# Patient Record
Sex: FEMALE | Race: WHITE | NOT HISPANIC OR LATINO | Employment: FULL TIME | URBAN - METROPOLITAN AREA
[De-identification: names, ages, dates, MRNs, and addresses within clinical notes are randomized per-mention and may not be internally consistent; named-entity substitution may affect disease eponyms.]

---

## 2017-01-09 ENCOUNTER — OFFICE VISIT - HEALTHEAST (OUTPATIENT)
Dept: RHEUMATOLOGY | Facility: CLINIC | Age: 47
End: 2017-01-09

## 2017-01-09 DIAGNOSIS — L98.491 ISCHEMIC ULCER OF FINGER, LIMITED TO BREAKDOWN OF SKIN (H): ICD-10-CM

## 2017-01-09 DIAGNOSIS — I73.00 RAYNAUD'S DISEASE WITHOUT GANGRENE: ICD-10-CM

## 2017-01-09 DIAGNOSIS — R60.0 BILATERAL LOWER EXTREMITY EDEMA: ICD-10-CM

## 2017-02-02 ENCOUNTER — COMMUNICATION - HEALTHEAST (OUTPATIENT)
Dept: RHEUMATOLOGY | Facility: CLINIC | Age: 47
End: 2017-02-02

## 2017-02-02 DIAGNOSIS — I73.00 RAYNAUD'S DISEASE WITHOUT GANGRENE: ICD-10-CM

## 2017-02-02 DIAGNOSIS — L98.491 ISCHEMIC ULCER OF FINGER, LIMITED TO BREAKDOWN OF SKIN (H): ICD-10-CM

## 2017-05-03 ENCOUNTER — COMMUNICATION - HEALTHEAST (OUTPATIENT)
Dept: FAMILY MEDICINE | Facility: CLINIC | Age: 47
End: 2017-05-03

## 2017-05-03 DIAGNOSIS — I73.00 RAYNAUD'S DISEASE WITHOUT GANGRENE: ICD-10-CM

## 2017-05-09 ENCOUNTER — OFFICE VISIT - HEALTHEAST (OUTPATIENT)
Dept: RHEUMATOLOGY | Facility: CLINIC | Age: 47
End: 2017-05-09

## 2017-05-09 DIAGNOSIS — I73.00 RAYNAUD'S DISEASE WITHOUT GANGRENE: ICD-10-CM

## 2017-05-09 DIAGNOSIS — R12 HEARTBURN: ICD-10-CM

## 2017-05-09 DIAGNOSIS — L98.491 ISCHEMIC ULCER OF FINGER, LIMITED TO BREAKDOWN OF SKIN (H): ICD-10-CM

## 2017-05-09 LAB
ALT SERPL W P-5'-P-CCNC: 16 U/L (ref 0–45)
CREAT SERPL-MCNC: 0.76 MG/DL (ref 0.6–1.1)
GFR SERPL CREATININE-BSD FRML MDRD: >60 ML/MIN/1.73M2

## 2017-05-09 ASSESSMENT — MIFFLIN-ST. JEOR: SCORE: 1384.27

## 2017-05-10 LAB — CENTROMERE AB, IGG, S: <0.2 U

## 2017-09-25 ENCOUNTER — COMMUNICATION - HEALTHEAST (OUTPATIENT)
Dept: ADMINISTRATIVE | Facility: CLINIC | Age: 47
End: 2017-09-25

## 2017-09-25 DIAGNOSIS — I73.00 RAYNAUD'S DISEASE WITHOUT GANGRENE: ICD-10-CM

## 2017-09-25 DIAGNOSIS — L98.491 ISCHEMIC ULCER OF FINGER, LIMITED TO BREAKDOWN OF SKIN (H): ICD-10-CM

## 2017-11-11 ENCOUNTER — COMMUNICATION - HEALTHEAST (OUTPATIENT)
Dept: RHEUMATOLOGY | Facility: CLINIC | Age: 47
End: 2017-11-11

## 2017-11-11 DIAGNOSIS — I73.00 RAYNAUD'S DISEASE WITHOUT GANGRENE: ICD-10-CM

## 2017-11-14 ENCOUNTER — COMMUNICATION - HEALTHEAST (OUTPATIENT)
Dept: RHEUMATOLOGY | Facility: CLINIC | Age: 47
End: 2017-11-14

## 2017-11-14 DIAGNOSIS — I73.00 RAYNAUD'S DISEASE WITHOUT GANGRENE: ICD-10-CM

## 2017-11-22 ENCOUNTER — OFFICE VISIT - HEALTHEAST (OUTPATIENT)
Dept: RHEUMATOLOGY | Facility: CLINIC | Age: 47
End: 2017-11-22

## 2017-11-22 DIAGNOSIS — R12 HEARTBURN: ICD-10-CM

## 2017-11-22 DIAGNOSIS — L98.491 ISCHEMIC ULCER OF FINGER, LIMITED TO BREAKDOWN OF SKIN (H): ICD-10-CM

## 2017-11-22 DIAGNOSIS — I73.00 RAYNAUD'S DISEASE WITHOUT GANGRENE: ICD-10-CM

## 2017-11-22 ASSESSMENT — MIFFLIN-ST. JEOR: SCORE: 1383.82

## 2018-01-10 ENCOUNTER — COMMUNICATION - HEALTHEAST (OUTPATIENT)
Dept: RHEUMATOLOGY | Facility: CLINIC | Age: 48
End: 2018-01-10

## 2018-01-10 DIAGNOSIS — I73.00 RAYNAUD'S DISEASE WITHOUT GANGRENE: ICD-10-CM

## 2018-01-10 DIAGNOSIS — L98.491 ISCHEMIC ULCER OF FINGER, LIMITED TO BREAKDOWN OF SKIN (H): ICD-10-CM

## 2018-01-12 ENCOUNTER — COMMUNICATION - HEALTHEAST (OUTPATIENT)
Dept: ADMINISTRATIVE | Facility: CLINIC | Age: 48
End: 2018-01-12

## 2018-01-24 ENCOUNTER — COMMUNICATION - HEALTHEAST (OUTPATIENT)
Dept: RHEUMATOLOGY | Facility: CLINIC | Age: 48
End: 2018-01-24

## 2018-02-14 ENCOUNTER — COMMUNICATION - HEALTHEAST (OUTPATIENT)
Dept: RHEUMATOLOGY | Facility: CLINIC | Age: 48
End: 2018-02-14

## 2018-04-16 ENCOUNTER — COMMUNICATION - HEALTHEAST (OUTPATIENT)
Dept: RHEUMATOLOGY | Facility: CLINIC | Age: 48
End: 2018-04-16

## 2018-04-16 DIAGNOSIS — I73.00 RAYNAUD'S DISEASE WITHOUT GANGRENE: ICD-10-CM

## 2018-04-16 DIAGNOSIS — L98.491 ISCHEMIC ULCER OF FINGER, LIMITED TO BREAKDOWN OF SKIN (H): ICD-10-CM

## 2018-07-16 ENCOUNTER — COMMUNICATION - HEALTHEAST (OUTPATIENT)
Dept: RHEUMATOLOGY | Facility: CLINIC | Age: 48
End: 2018-07-16

## 2018-07-16 DIAGNOSIS — I73.00 RAYNAUD'S DISEASE WITHOUT GANGRENE: ICD-10-CM

## 2018-07-25 ENCOUNTER — OFFICE VISIT - HEALTHEAST (OUTPATIENT)
Dept: FAMILY MEDICINE | Facility: CLINIC | Age: 48
End: 2018-07-25

## 2018-07-25 DIAGNOSIS — B35.3 ATHLETE'S FOOT: ICD-10-CM

## 2018-07-25 DIAGNOSIS — Z00.00 HEALTHCARE MAINTENANCE: ICD-10-CM

## 2018-07-25 DIAGNOSIS — M79.89 HAND SWELLING: ICD-10-CM

## 2018-07-25 DIAGNOSIS — Z12.31 VISIT FOR SCREENING MAMMOGRAM: ICD-10-CM

## 2018-07-25 DIAGNOSIS — N92.0 MENORRHAGIA: ICD-10-CM

## 2018-07-25 LAB
ANION GAP SERPL CALCULATED.3IONS-SCNC: 8 MMOL/L (ref 5–18)
BUN SERPL-MCNC: 16 MG/DL (ref 8–22)
CALCIUM SERPL-MCNC: 9.5 MG/DL (ref 8.5–10.5)
CHLORIDE BLD-SCNC: 104 MMOL/L (ref 98–107)
CHOLEST SERPL-MCNC: 204 MG/DL
CO2 SERPL-SCNC: 25 MMOL/L (ref 22–31)
CREAT SERPL-MCNC: 0.78 MG/DL (ref 0.6–1.1)
FASTING STATUS PATIENT QL REPORTED: NO
GFR SERPL CREATININE-BSD FRML MDRD: >60 ML/MIN/1.73M2
GLUCOSE BLD-MCNC: 76 MG/DL (ref 70–125)
HBA1C MFR BLD: 4.7 % (ref 3.5–6)
HDLC SERPL-MCNC: 75 MG/DL
LDLC SERPL CALC-MCNC: 115 MG/DL
POTASSIUM BLD-SCNC: 4 MMOL/L (ref 3.5–5)
SODIUM SERPL-SCNC: 137 MMOL/L (ref 136–145)
TRIGL SERPL-MCNC: 70 MG/DL
TSH SERPL DL<=0.005 MIU/L-ACNC: 0.86 UIU/ML (ref 0.3–5)

## 2018-07-25 ASSESSMENT — MIFFLIN-ST. JEOR: SCORE: 1356.38

## 2018-07-26 LAB
HPV SOURCE: NORMAL
HUMAN PAPILLOMA VIRUS 16 DNA: NEGATIVE
HUMAN PAPILLOMA VIRUS 18 DNA: NEGATIVE
HUMAN PAPILLOMA VIRUS FINAL DIAGNOSIS: NORMAL
HUMAN PAPILLOMA VIRUS OTHER HR: NEGATIVE
SPECIMEN DESCRIPTION: NORMAL

## 2018-07-30 ENCOUNTER — COMMUNICATION - HEALTHEAST (OUTPATIENT)
Dept: RHEUMATOLOGY | Facility: CLINIC | Age: 48
End: 2018-07-30

## 2018-07-30 DIAGNOSIS — I73.00 RAYNAUD'S DISEASE WITHOUT GANGRENE: ICD-10-CM

## 2018-07-30 DIAGNOSIS — L98.491 ISCHEMIC ULCER OF FINGER, LIMITED TO BREAKDOWN OF SKIN (H): ICD-10-CM

## 2018-08-02 LAB
BKR LAB AP ABNORMAL BLEEDING: NO
BKR LAB AP BIRTH CONTROL/HORMONES: NORMAL
BKR LAB AP CERVICAL APPEARANCE: NORMAL
BKR LAB AP GYN ADEQUACY: NORMAL
BKR LAB AP GYN INTERPRETATION: NORMAL
BKR LAB AP HPV REFLEX: NORMAL
BKR LAB AP LMP: NORMAL
BKR LAB AP PATIENT STATUS: NORMAL
BKR LAB AP PREVIOUS ABNORMAL: NORMAL
BKR LAB AP PREVIOUS NORMAL: 2014
HIGH RISK?: NO
PATH REPORT.COMMENTS IMP SPEC: NORMAL
RESULT FLAG (HE HISTORICAL CONVERSION): NORMAL

## 2018-08-06 ENCOUNTER — HOSPITAL ENCOUNTER (OUTPATIENT)
Dept: MAMMOGRAPHY | Facility: CLINIC | Age: 48
Discharge: HOME | End: 2018-08-06
Attending: FAMILY MEDICINE

## 2018-08-06 DIAGNOSIS — Z12.31 VISIT FOR SCREENING MAMMOGRAM: ICD-10-CM

## 2018-08-27 ENCOUNTER — OFFICE VISIT - HEALTHEAST (OUTPATIENT)
Dept: RHEUMATOLOGY | Facility: CLINIC | Age: 48
End: 2018-08-27

## 2018-08-27 DIAGNOSIS — L98.491 ISCHEMIC ULCER OF FINGER, LIMITED TO BREAKDOWN OF SKIN (H): ICD-10-CM

## 2018-08-27 DIAGNOSIS — I73.00 RAYNAUD'S DISEASE WITHOUT GANGRENE: ICD-10-CM

## 2018-08-27 DIAGNOSIS — R12 HEARTBURN: ICD-10-CM

## 2018-08-27 LAB
BASOPHILS # BLD AUTO: 0 THOU/UL (ref 0–0.2)
BASOPHILS NFR BLD AUTO: 0 % (ref 0–2)
EOSINOPHIL # BLD AUTO: 0.2 THOU/UL (ref 0–0.4)
EOSINOPHIL NFR BLD AUTO: 3 % (ref 0–6)
ERYTHROCYTE [DISTWIDTH] IN BLOOD BY AUTOMATED COUNT: 11.1 % (ref 11–14.5)
HCT VFR BLD AUTO: 38.9 % (ref 35–47)
HGB BLD-MCNC: 13.5 G/DL (ref 12–16)
LYMPHOCYTES # BLD AUTO: 1.2 THOU/UL (ref 0.8–4.4)
LYMPHOCYTES NFR BLD AUTO: 20 % (ref 20–40)
MCH RBC QN AUTO: 33.3 PG (ref 27–34)
MCHC RBC AUTO-ENTMCNC: 34.8 G/DL (ref 32–36)
MCV RBC AUTO: 96 FL (ref 80–100)
MONOCYTES # BLD AUTO: 0.5 THOU/UL (ref 0–0.9)
MONOCYTES NFR BLD AUTO: 8 % (ref 2–10)
NEUTROPHILS # BLD AUTO: 4.2 THOU/UL (ref 2–7.7)
NEUTROPHILS NFR BLD AUTO: 69 % (ref 50–70)
PLATELET # BLD AUTO: 245 THOU/UL (ref 140–440)
PMV BLD AUTO: 7.7 FL (ref 7–10)
RBC # BLD AUTO: 4.07 MILL/UL (ref 3.8–5.4)
WBC: 6.1 THOU/UL (ref 4–11)

## 2018-09-13 ENCOUNTER — COMMUNICATION - HEALTHEAST (OUTPATIENT)
Dept: RHEUMATOLOGY | Facility: CLINIC | Age: 48
End: 2018-09-13

## 2018-09-13 DIAGNOSIS — I73.00 RAYNAUD'S DISEASE WITHOUT GANGRENE: ICD-10-CM

## 2018-09-14 ENCOUNTER — COMMUNICATION - HEALTHEAST (OUTPATIENT)
Dept: RHEUMATOLOGY | Facility: CLINIC | Age: 48
End: 2018-09-14

## 2018-09-14 DIAGNOSIS — I73.00 RAYNAUD'S DISEASE WITHOUT GANGRENE: ICD-10-CM

## 2018-09-18 ENCOUNTER — COMMUNICATION - HEALTHEAST (OUTPATIENT)
Dept: RHEUMATOLOGY | Facility: CLINIC | Age: 48
End: 2018-09-18

## 2018-09-18 DIAGNOSIS — I73.00 RAYNAUD'S DISEASE WITHOUT GANGRENE: ICD-10-CM

## 2018-11-21 ENCOUNTER — COMMUNICATION - HEALTHEAST (OUTPATIENT)
Dept: RHEUMATOLOGY | Facility: CLINIC | Age: 48
End: 2018-11-21

## 2018-11-21 DIAGNOSIS — I73.00 RAYNAUD'S DISEASE WITHOUT GANGRENE: ICD-10-CM

## 2018-12-20 ENCOUNTER — COMMUNICATION - HEALTHEAST (OUTPATIENT)
Dept: RHEUMATOLOGY | Facility: CLINIC | Age: 48
End: 2018-12-20

## 2018-12-20 DIAGNOSIS — I73.00 RAYNAUD'S DISEASE WITHOUT GANGRENE: ICD-10-CM

## 2018-12-21 ENCOUNTER — COMMUNICATION - HEALTHEAST (OUTPATIENT)
Dept: FAMILY MEDICINE | Facility: CLINIC | Age: 48
End: 2018-12-21

## 2019-01-11 ENCOUNTER — COMMUNICATION - HEALTHEAST (OUTPATIENT)
Dept: FAMILY MEDICINE | Facility: CLINIC | Age: 49
End: 2019-01-11

## 2019-01-11 DIAGNOSIS — M79.89 HAND SWELLING: ICD-10-CM

## 2019-01-17 ENCOUNTER — COMMUNICATION - HEALTHEAST (OUTPATIENT)
Dept: RHEUMATOLOGY | Facility: CLINIC | Age: 49
End: 2019-01-17

## 2019-01-17 DIAGNOSIS — L98.491 ISCHEMIC ULCER OF FINGER, LIMITED TO BREAKDOWN OF SKIN (H): ICD-10-CM

## 2019-01-17 DIAGNOSIS — I73.00 RAYNAUD'S DISEASE WITHOUT GANGRENE: ICD-10-CM

## 2019-03-06 ENCOUNTER — OFFICE VISIT - HEALTHEAST (OUTPATIENT)
Dept: FAMILY MEDICINE | Facility: CLINIC | Age: 49
End: 2019-03-06

## 2019-03-06 DIAGNOSIS — Z23 IMMUNIZATION DUE: ICD-10-CM

## 2019-03-06 DIAGNOSIS — L98.491 ISCHEMIC ULCER OF FINGER, LIMITED TO BREAKDOWN OF SKIN (H): ICD-10-CM

## 2019-03-06 DIAGNOSIS — R22.9 SKIN NODULE: ICD-10-CM

## 2019-03-16 ENCOUNTER — COMMUNICATION - HEALTHEAST (OUTPATIENT)
Dept: RHEUMATOLOGY | Facility: CLINIC | Age: 49
End: 2019-03-16

## 2019-03-16 DIAGNOSIS — I73.00 RAYNAUD'S DISEASE WITHOUT GANGRENE: ICD-10-CM

## 2019-04-03 ENCOUNTER — OFFICE VISIT - HEALTHEAST (OUTPATIENT)
Dept: RHEUMATOLOGY | Facility: CLINIC | Age: 49
End: 2019-04-03

## 2019-04-03 DIAGNOSIS — L98.491 ISCHEMIC ULCER OF FINGER, LIMITED TO BREAKDOWN OF SKIN (H): ICD-10-CM

## 2019-04-03 DIAGNOSIS — I73.00 RAYNAUD'S DISEASE WITHOUT GANGRENE: ICD-10-CM

## 2019-04-03 LAB
ALBUMIN SERPL-MCNC: 4.3 G/DL (ref 3.5–5)
ALT SERPL W P-5'-P-CCNC: 34 U/L (ref 0–45)
CREAT SERPL-MCNC: 0.8 MG/DL (ref 0.6–1.1)
ERYTHROCYTE [DISTWIDTH] IN BLOOD BY AUTOMATED COUNT: 11.3 % (ref 11–14.5)
GFR SERPL CREATININE-BSD FRML MDRD: >60 ML/MIN/1.73M2
HCT VFR BLD AUTO: 39.6 % (ref 35–47)
HGB BLD-MCNC: 13.3 G/DL (ref 12–16)
MCH RBC QN AUTO: 32.5 PG (ref 27–34)
MCHC RBC AUTO-ENTMCNC: 33.5 G/DL (ref 32–36)
MCV RBC AUTO: 97 FL (ref 80–100)
PLATELET # BLD AUTO: 268 THOU/UL (ref 140–440)
PMV BLD AUTO: 7.9 FL (ref 7–10)
RBC # BLD AUTO: 4.09 MILL/UL (ref 3.8–5.4)
WBC: 7 THOU/UL (ref 4–11)

## 2019-04-08 ENCOUNTER — COMMUNICATION - HEALTHEAST (OUTPATIENT)
Dept: FAMILY MEDICINE | Facility: CLINIC | Age: 49
End: 2019-04-08

## 2019-04-08 DIAGNOSIS — M79.89 HAND SWELLING: ICD-10-CM

## 2019-04-15 ENCOUNTER — COMMUNICATION - HEALTHEAST (OUTPATIENT)
Dept: RHEUMATOLOGY | Facility: CLINIC | Age: 49
End: 2019-04-15

## 2019-04-23 ENCOUNTER — COMMUNICATION - HEALTHEAST (OUTPATIENT)
Dept: RHEUMATOLOGY | Facility: CLINIC | Age: 49
End: 2019-04-23

## 2019-06-09 ENCOUNTER — COMMUNICATION - HEALTHEAST (OUTPATIENT)
Dept: RHEUMATOLOGY | Facility: CLINIC | Age: 49
End: 2019-06-09

## 2019-06-09 DIAGNOSIS — I73.00 RAYNAUD'S DISEASE WITHOUT GANGRENE: ICD-10-CM

## 2019-06-26 ENCOUNTER — AMBULATORY - HEALTHEAST (OUTPATIENT)
Dept: NURSING | Facility: CLINIC | Age: 49
End: 2019-06-26

## 2019-06-26 DIAGNOSIS — Z23 IMMUNIZATION DUE: ICD-10-CM

## 2019-09-21 ENCOUNTER — COMMUNICATION - HEALTHEAST (OUTPATIENT)
Dept: RHEUMATOLOGY | Facility: CLINIC | Age: 49
End: 2019-09-21

## 2019-09-21 DIAGNOSIS — I73.00 RAYNAUD'S DISEASE WITHOUT GANGRENE: ICD-10-CM

## 2019-10-07 ENCOUNTER — COMMUNICATION - HEALTHEAST (OUTPATIENT)
Dept: RHEUMATOLOGY | Facility: CLINIC | Age: 49
End: 2019-10-07

## 2019-10-07 DIAGNOSIS — I73.00 RAYNAUD'S DISEASE WITHOUT GANGRENE: ICD-10-CM

## 2019-10-07 DIAGNOSIS — L98.491 ISCHEMIC ULCER OF FINGER, LIMITED TO BREAKDOWN OF SKIN (H): ICD-10-CM

## 2019-10-16 ENCOUNTER — COMMUNICATION - HEALTHEAST (OUTPATIENT)
Dept: RHEUMATOLOGY | Facility: CLINIC | Age: 49
End: 2019-10-16

## 2019-10-16 ENCOUNTER — OFFICE VISIT - HEALTHEAST (OUTPATIENT)
Dept: FAMILY MEDICINE | Facility: CLINIC | Age: 49
End: 2019-10-16

## 2019-10-16 DIAGNOSIS — L98.491 ISCHEMIC ULCER OF FINGER, LIMITED TO BREAKDOWN OF SKIN (H): ICD-10-CM

## 2019-10-16 DIAGNOSIS — I73.00 RAYNAUD'S DISEASE WITHOUT GANGRENE: ICD-10-CM

## 2019-10-16 DIAGNOSIS — Z12.11 SCREEN FOR COLON CANCER: ICD-10-CM

## 2019-10-16 DIAGNOSIS — Z12.31 VISIT FOR SCREENING MAMMOGRAM: ICD-10-CM

## 2019-10-16 DIAGNOSIS — M79.10 MYALGIA: ICD-10-CM

## 2019-10-16 DIAGNOSIS — Z11.4 ENCOUNTER FOR SCREENING FOR HIV: ICD-10-CM

## 2019-10-16 LAB
ALBUMIN SERPL-MCNC: 4 G/DL (ref 3.5–5)
ALP SERPL-CCNC: 54 U/L (ref 45–120)
ALT SERPL W P-5'-P-CCNC: 37 U/L (ref 0–45)
ANION GAP SERPL CALCULATED.3IONS-SCNC: 11 MMOL/L (ref 5–18)
AST SERPL W P-5'-P-CCNC: 44 U/L (ref 0–40)
BILIRUB SERPL-MCNC: 0.4 MG/DL (ref 0–1)
BUN SERPL-MCNC: 12 MG/DL (ref 8–22)
CALCIUM SERPL-MCNC: 9.5 MG/DL (ref 8.5–10.5)
CHLORIDE BLD-SCNC: 101 MMOL/L (ref 98–107)
CO2 SERPL-SCNC: 25 MMOL/L (ref 22–31)
CREAT SERPL-MCNC: 0.73 MG/DL (ref 0.6–1.1)
ERYTHROCYTE [DISTWIDTH] IN BLOOD BY AUTOMATED COUNT: 11.3 % (ref 11–14.5)
GFR SERPL CREATININE-BSD FRML MDRD: >60 ML/MIN/1.73M2
GLUCOSE BLD-MCNC: 84 MG/DL (ref 70–125)
HCT VFR BLD AUTO: 41.2 % (ref 35–47)
HGB BLD-MCNC: 13.6 G/DL (ref 12–16)
HIV 1+2 AB+HIV1 P24 AG SERPL QL IA: NEGATIVE
MCH RBC QN AUTO: 32.7 PG (ref 27–34)
MCHC RBC AUTO-ENTMCNC: 33 G/DL (ref 32–36)
MCV RBC AUTO: 99 FL (ref 80–100)
PLATELET # BLD AUTO: 242 THOU/UL (ref 140–440)
PMV BLD AUTO: 7.7 FL (ref 7–10)
POTASSIUM BLD-SCNC: 4.5 MMOL/L (ref 3.5–5)
PROT SERPL-MCNC: 7.1 G/DL (ref 6–8)
RBC # BLD AUTO: 4.17 MILL/UL (ref 3.8–5.4)
SODIUM SERPL-SCNC: 137 MMOL/L (ref 136–145)
TSH SERPL DL<=0.005 MIU/L-ACNC: 0.98 UIU/ML (ref 0.3–5)
WBC: 6.4 THOU/UL (ref 4–11)

## 2019-10-17 ENCOUNTER — COMMUNICATION - HEALTHEAST (OUTPATIENT)
Dept: FAMILY MEDICINE | Facility: CLINIC | Age: 49
End: 2019-10-17

## 2019-10-17 LAB — B BURGDOR IGG+IGM SER QL: 0.12 INDEX VALUE

## 2019-11-27 ENCOUNTER — HOSPITAL ENCOUNTER (OUTPATIENT)
Dept: MAMMOGRAPHY | Facility: CLINIC | Age: 49
Discharge: HOME | End: 2019-11-27

## 2019-11-27 DIAGNOSIS — Z12.31 VISIT FOR SCREENING MAMMOGRAM: ICD-10-CM

## 2019-12-11 ENCOUNTER — OFFICE VISIT - HEALTHEAST (OUTPATIENT)
Dept: RHEUMATOLOGY | Facility: CLINIC | Age: 49
End: 2019-12-11

## 2019-12-11 DIAGNOSIS — L98.491 ISCHEMIC ULCER OF FINGER, LIMITED TO BREAKDOWN OF SKIN (H): ICD-10-CM

## 2019-12-11 DIAGNOSIS — I73.00 RAYNAUD'S DISEASE WITHOUT GANGRENE: ICD-10-CM

## 2019-12-13 ENCOUNTER — COMMUNICATION - HEALTHEAST (OUTPATIENT)
Dept: RHEUMATOLOGY | Facility: CLINIC | Age: 49
End: 2019-12-13

## 2019-12-13 DIAGNOSIS — I73.00 RAYNAUD'S DISEASE WITHOUT GANGRENE: ICD-10-CM

## 2019-12-31 ENCOUNTER — RECORDS - HEALTHEAST (OUTPATIENT)
Dept: ADMINISTRATIVE | Facility: OTHER | Age: 49
End: 2019-12-31

## 2020-03-19 ENCOUNTER — COMMUNICATION - HEALTHEAST (OUTPATIENT)
Dept: ADMINISTRATIVE | Facility: CLINIC | Age: 50
End: 2020-03-19

## 2020-03-25 ENCOUNTER — COMMUNICATION - HEALTHEAST (OUTPATIENT)
Dept: RHEUMATOLOGY | Facility: CLINIC | Age: 50
End: 2020-03-25

## 2020-03-25 DIAGNOSIS — I73.00 RAYNAUD'S DISEASE WITHOUT GANGRENE: ICD-10-CM

## 2020-05-11 ENCOUNTER — COMMUNICATION - HEALTHEAST (OUTPATIENT)
Dept: RHEUMATOLOGY | Facility: CLINIC | Age: 50
End: 2020-05-11

## 2020-05-11 DIAGNOSIS — I73.00 RAYNAUD'S DISEASE WITHOUT GANGRENE: ICD-10-CM

## 2020-05-11 DIAGNOSIS — L98.491 ISCHEMIC ULCER OF FINGER, LIMITED TO BREAKDOWN OF SKIN (H): ICD-10-CM

## 2020-05-13 ENCOUNTER — COMMUNICATION - HEALTHEAST (OUTPATIENT)
Dept: RHEUMATOLOGY | Facility: CLINIC | Age: 50
End: 2020-05-13

## 2020-05-19 ENCOUNTER — OFFICE VISIT - HEALTHEAST (OUTPATIENT)
Dept: RHEUMATOLOGY | Facility: CLINIC | Age: 50
End: 2020-05-19

## 2020-05-19 DIAGNOSIS — I73.00 RAYNAUD'S DISEASE WITHOUT GANGRENE: ICD-10-CM

## 2020-05-19 DIAGNOSIS — L98.491 ISCHEMIC ULCER OF FINGER, LIMITED TO BREAKDOWN OF SKIN (H): ICD-10-CM

## 2020-06-16 ENCOUNTER — COMMUNICATION - HEALTHEAST (OUTPATIENT)
Dept: FAMILY MEDICINE | Facility: CLINIC | Age: 50
End: 2020-06-16

## 2020-06-16 DIAGNOSIS — M79.89 HAND SWELLING: ICD-10-CM

## 2020-07-11 ENCOUNTER — COMMUNICATION - HEALTHEAST (OUTPATIENT)
Dept: RHEUMATOLOGY | Facility: CLINIC | Age: 50
End: 2020-07-11

## 2020-07-11 DIAGNOSIS — L98.491 ISCHEMIC ULCER OF FINGER, LIMITED TO BREAKDOWN OF SKIN (H): ICD-10-CM

## 2020-07-11 DIAGNOSIS — I73.00 RAYNAUD'S DISEASE WITHOUT GANGRENE: ICD-10-CM

## 2020-08-11 ENCOUNTER — COMMUNICATION - HEALTHEAST (OUTPATIENT)
Dept: RHEUMATOLOGY | Facility: CLINIC | Age: 50
End: 2020-08-11

## 2020-09-11 ENCOUNTER — COMMUNICATION - HEALTHEAST (OUTPATIENT)
Dept: RHEUMATOLOGY | Facility: CLINIC | Age: 50
End: 2020-09-11

## 2020-09-11 DIAGNOSIS — I73.00 RAYNAUD'S DISEASE WITHOUT GANGRENE: ICD-10-CM

## 2020-09-15 ENCOUNTER — COMMUNICATION - HEALTHEAST (OUTPATIENT)
Dept: RHEUMATOLOGY | Facility: CLINIC | Age: 50
End: 2020-09-15

## 2020-09-28 ENCOUNTER — COMMUNICATION - HEALTHEAST (OUTPATIENT)
Dept: RHEUMATOLOGY | Facility: CLINIC | Age: 50
End: 2020-09-28

## 2020-09-28 DIAGNOSIS — I73.00 RAYNAUD'S DISEASE WITHOUT GANGRENE: ICD-10-CM

## 2020-09-28 DIAGNOSIS — L98.491 ISCHEMIC ULCER OF FINGER, LIMITED TO BREAKDOWN OF SKIN (H): ICD-10-CM

## 2020-10-25 ENCOUNTER — COMMUNICATION - HEALTHEAST (OUTPATIENT)
Dept: RHEUMATOLOGY | Facility: CLINIC | Age: 50
End: 2020-10-25

## 2020-10-25 DIAGNOSIS — L98.491 ISCHEMIC ULCER OF FINGER, LIMITED TO BREAKDOWN OF SKIN (H): ICD-10-CM

## 2020-10-25 DIAGNOSIS — I73.00 RAYNAUD'S DISEASE WITHOUT GANGRENE: ICD-10-CM

## 2020-10-26 ENCOUNTER — AMBULATORY - HEALTHEAST (OUTPATIENT)
Dept: RHEUMATOLOGY | Facility: CLINIC | Age: 50
End: 2020-10-26

## 2020-10-26 DIAGNOSIS — I73.00 RAYNAUD'S DISEASE WITHOUT GANGRENE: ICD-10-CM

## 2020-11-16 ENCOUNTER — AMBULATORY - HEALTHEAST (OUTPATIENT)
Dept: LAB | Facility: CLINIC | Age: 50
End: 2020-11-16

## 2020-11-16 DIAGNOSIS — I73.00 RAYNAUD'S DISEASE WITHOUT GANGRENE: ICD-10-CM

## 2020-11-16 LAB
ALBUMIN SERPL-MCNC: 4.2 G/DL (ref 3.5–5)
ALT SERPL W P-5'-P-CCNC: 28 U/L (ref 0–45)
CREAT SERPL-MCNC: 0.78 MG/DL (ref 0.6–1.1)
ERYTHROCYTE [DISTWIDTH] IN BLOOD BY AUTOMATED COUNT: 10.4 % (ref 11–14.5)
GFR SERPL CREATININE-BSD FRML MDRD: >60 ML/MIN/1.73M2
HCT VFR BLD AUTO: 40.8 % (ref 35–47)
HGB BLD-MCNC: 13.7 G/DL (ref 12–16)
MCH RBC QN AUTO: 33.6 PG (ref 27–34)
MCHC RBC AUTO-ENTMCNC: 33.7 G/DL (ref 32–36)
MCV RBC AUTO: 100 FL (ref 80–100)
PLATELET # BLD AUTO: 241 THOU/UL (ref 140–440)
PMV BLD AUTO: 8.1 FL (ref 7–10)
RBC # BLD AUTO: 4.09 MILL/UL (ref 3.8–5.4)
WBC: 6 THOU/UL (ref 4–11)

## 2020-11-19 ENCOUNTER — OFFICE VISIT - HEALTHEAST (OUTPATIENT)
Dept: RHEUMATOLOGY | Facility: CLINIC | Age: 50
End: 2020-11-19

## 2020-11-19 DIAGNOSIS — L98.491 ISCHEMIC ULCER OF FINGER, LIMITED TO BREAKDOWN OF SKIN (H): ICD-10-CM

## 2020-11-19 DIAGNOSIS — R12 HEARTBURN: ICD-10-CM

## 2020-11-19 DIAGNOSIS — I73.00 RAYNAUD'S DISEASE WITHOUT GANGRENE: ICD-10-CM

## 2020-11-25 ENCOUNTER — COMMUNICATION - HEALTHEAST (OUTPATIENT)
Dept: RHEUMATOLOGY | Facility: CLINIC | Age: 50
End: 2020-11-25

## 2020-11-25 DIAGNOSIS — I73.00 RAYNAUD'S DISEASE WITHOUT GANGRENE: ICD-10-CM

## 2020-11-25 DIAGNOSIS — L98.491 ISCHEMIC ULCER OF FINGER, LIMITED TO BREAKDOWN OF SKIN (H): ICD-10-CM

## 2020-11-26 ENCOUNTER — COMMUNICATION - HEALTHEAST (OUTPATIENT)
Dept: RHEUMATOLOGY | Facility: CLINIC | Age: 50
End: 2020-11-26

## 2020-11-26 DIAGNOSIS — I73.00 RAYNAUD'S DISEASE WITHOUT GANGRENE: ICD-10-CM

## 2020-11-26 DIAGNOSIS — L98.491 ISCHEMIC ULCER OF FINGER, LIMITED TO BREAKDOWN OF SKIN (H): ICD-10-CM

## 2020-12-04 ENCOUNTER — COMMUNICATION - HEALTHEAST (OUTPATIENT)
Dept: FAMILY MEDICINE | Facility: CLINIC | Age: 50
End: 2020-12-04

## 2020-12-04 DIAGNOSIS — M79.89 HAND SWELLING: ICD-10-CM

## 2021-01-03 ENCOUNTER — COMMUNICATION - HEALTHEAST (OUTPATIENT)
Dept: FAMILY MEDICINE | Facility: CLINIC | Age: 51
End: 2021-01-03

## 2021-01-03 DIAGNOSIS — M79.89 HAND SWELLING: ICD-10-CM

## 2021-01-11 ENCOUNTER — COMMUNICATION - HEALTHEAST (OUTPATIENT)
Dept: RHEUMATOLOGY | Facility: CLINIC | Age: 51
End: 2021-01-11

## 2021-01-11 DIAGNOSIS — L98.491 ISCHEMIC ULCER OF FINGER, LIMITED TO BREAKDOWN OF SKIN (H): ICD-10-CM

## 2021-01-11 DIAGNOSIS — I73.00 RAYNAUD'S DISEASE WITHOUT GANGRENE: ICD-10-CM

## 2021-02-01 ENCOUNTER — TELEPHONE (OUTPATIENT)
Dept: FAMILY MEDICINE | Facility: CLINIC | Age: 51
End: 2021-02-01

## 2021-02-01 DIAGNOSIS — I10 HYPERTENSION, UNSPECIFIED TYPE: Primary | ICD-10-CM

## 2021-02-01 RX ORDER — HYDROCHLOROTHIAZIDE 12.5 MG/1
12.5 TABLET ORAL DAILY
Qty: 30 TABLET | Refills: 0 | Status: SHIPPED | OUTPATIENT
Start: 2021-02-01 | End: 2021-03-02

## 2021-02-01 RX ORDER — HYDROCHLOROTHIAZIDE 12.5 MG/1
12.5 TABLET ORAL DAILY
COMMUNITY
Start: 2021-01-04 | End: 2021-02-01

## 2021-02-01 NOTE — TELEPHONE ENCOUNTER
I sent a 30-day supply for this patient.  It appears by her [facility] chart that I have not seen her since the summer 2018.  Please help her make an appointment for a complete physical sometime in the next month.    Thanks!    EB

## 2021-02-01 NOTE — TELEPHONE ENCOUNTER
This is a Dr Samuels pt that has not been seen at a [facility].   Request is for hydrochlorothiazide 12.5 mg #30

## 2021-02-22 ENCOUNTER — COMMUNICATION - HEALTHEAST (OUTPATIENT)
Dept: RHEUMATOLOGY | Facility: CLINIC | Age: 51
End: 2021-02-22

## 2021-02-22 DIAGNOSIS — I73.00 RAYNAUD'S DISEASE WITHOUT GANGRENE: ICD-10-CM

## 2021-02-28 ENCOUNTER — COMMUNICATION - HEALTHEAST (OUTPATIENT)
Dept: RHEUMATOLOGY | Facility: CLINIC | Age: 51
End: 2021-02-28

## 2021-02-28 DIAGNOSIS — L98.491 ISCHEMIC ULCER OF FINGER, LIMITED TO BREAKDOWN OF SKIN (H): ICD-10-CM

## 2021-02-28 DIAGNOSIS — I73.00 RAYNAUD'S DISEASE WITHOUT GANGRENE: ICD-10-CM

## 2021-05-10 ENCOUNTER — COMMUNICATION - HEALTHEAST (OUTPATIENT)
Dept: ADMINISTRATIVE | Facility: CLINIC | Age: 51
End: 2021-05-10

## 2021-05-10 ENCOUNTER — RECORDS - HEALTHEAST (OUTPATIENT)
Dept: RHEUMATOLOGY | Facility: CLINIC | Age: 51
End: 2021-05-10

## 2021-05-20 ENCOUNTER — RECORDS - HEALTHEAST (OUTPATIENT)
Dept: RHEUMATOLOGY | Facility: CLINIC | Age: 51
End: 2021-05-20

## 2021-05-26 VITALS
RESPIRATION RATE: 12 BRPM | SYSTOLIC BLOOD PRESSURE: 118 MMHG | DIASTOLIC BLOOD PRESSURE: 80 MMHG | TEMPERATURE: 98.2 F | HEART RATE: 80 BPM

## 2021-05-27 NOTE — TELEPHONE ENCOUNTER
[facility] calling in regards of PA for    sildenafil, antihypertensive, (REVATIO) 20 mg tablet     Please call them @ [phone].

## 2021-05-27 NOTE — TELEPHONE ENCOUNTER
Refill Approved    Rx renewed per Medication Renewal Policy. Medication was last renewed on 1/11/19.    Molly Nieves, [facility] Triage/Med Refill 4/9/2019     Requested Prescriptions   Pending Prescriptions Disp Refills     hydroCHLOROthiazide (HYDRODIURIL) 12.5 MG tablet [Pharmacy Med Name: HYDROCHLOROTHIAZIDE 12.5 MG TB] 90 tablet 1     Sig: TAKE 1 TABLET BY MOUTH EVERY DAY       Diuretics/Combination Diuretics Refill Protocol  Passed - 4/8/2019 12:36 PM        Passed - Visit with PCP or prescribing provider visit in past 12 months     Last office visit with prescriber/PCP: 5/16/2016 Kathryn Samuels MD OR same dept: 3/6/2019 Libby Silva MD OR same specialty: 3/6/2019 Libby Silva MD  Last physical: 7/25/2018 Last MTM visit: Visit date not found   Next visit within 3 mo: Visit date not found  Next physical within 3 mo: Visit date not found  Prescriber OR PCP: Kathryn Samuels MD  Last diagnosis associated with med order: 1. Hand swelling  - hydroCHLOROthiazide (HYDRODIURIL) 12.5 MG tablet [Pharmacy Med Name: HYDROCHLOROTHIAZIDE 12.5 MG TB]; TAKE 1 TABLET BY MOUTH EVERY DAY  Dispense: 90 tablet; Refill: 1    If protocol passes may refill for 12 months if within 3 months of last provider visit (or a total of 15 months).             Passed - Serum Potassium in past 12 months      Lab Results   Component Value Date    Potassium 4.0 07/25/2018             Passed - Serum Sodium in past 12 months      Lab Results   Component Value Date    Sodium 137 07/25/2018             Passed - Blood pressure on file in past 12 months     BP Readings from Last 1 Encounters:   04/03/19 118/78             Passed - Serum Creatinine in past 12 months      Creatinine   Date Value Ref Range Status   04/03/2019 0.80 0.60 - 1.10 mg/dL Final

## 2021-05-27 NOTE — TELEPHONE ENCOUNTER
Central PA team  [phone]  Pool: HE PA MED ([id number])          PA has been initiated.       PA form completed and faxed insurance via Cover My Meds     Key:  [id number]     Medication:  Sildenafil Citrate 20MG tablets    Insurance:  [employer]        Response will be received via fax and may take up to 5-10 business days depending on plan

## 2021-05-27 NOTE — PROGRESS NOTES
"ASSESSMENT AND PLAN:  Brenda Gunn 48 y.o. female is here for follow-up of severe ulcerative Raynaud's disease responding nicely to sildenafil, amlodipine with some ankle edema.  She has well-controlled reflux symptoms.  She does not have features suggestive of connective tissue disease otherwise such as scleroderma.  She has negative SIMONE, negative SCL 70/anticentromere.  I have asked her to continue sildenafil as now and normal.  Follow-up in 6 months.        Diagnoses and all orders for this visit:    Raynaud's disease without gangrene  -     sildenafil, antihypertensive, (REVATIO) 20 mg tablet  Dispense: 270 tablet; Refill: 0  -     ALT (SGPT)  -     Albumin  -     Creatinine  -     HM2(CBC w/o Differential)    Ischemic ulcer of finger, limited to breakdown of skin (H)  -     sildenafil, antihypertensive, (REVATIO) 20 mg tablet  Dispense: 270 tablet; Refill: 0      HISTORY OF PRESENTING ILLNESS:  Brenda Gunn, 48 y.o., female is here for follow-up of severe ray nods associated with digital ulceration, she reports mild discomfort intermittently in the digits especially weather turns cold.  She has not had swollen joints during this time more painful joints.  She is able to do all her day-to-day activities without difficulty.  Morning stiffness is no more than 5 minutes.  She has had healing of the ulcers that she developed during the winter months. There is no fever or weight loss blurry vision eye redness mouth also she has had nausea there is no rash.  She does not have photosensitivity.  She still gets heartburn.  When she was on amlodipine twice daily, 10 mg, she had edema of the lower extremities.  She has has not had repeated miscarriages, DVT PE, seizures or kidney issues.  She noted no shortness of breath.  She has not had heartburn.  She has not noted a \"red spots such as in her buccal mucosa and lips or tongue.  She is not a smoker.  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  During the summer " months this issue was not that significant.  This is interfering now with many of her day-to-day activities.  Mom is said to have arthritis, osteoporosis and a grandmother rheumatoid arthritis.Further historical information and ADL limitations as noted in the multidimensional health assessment questionnaire attached in the EMR.    ALLERGIES:Ortho tri-cyclen (21)    PAST MEDICAL/ACTIVE PROBLEMS/MEDICATION/ FAMILY HISTORY/SOCIAL DATA:  The patient has a family history of  No past medical history on file.  Social History     Tobacco Use   Smoking Status Never Smoker   Smokeless Tobacco Never Used     Patient Active Problem List   Diagnosis     Raynaud's disease without gangrene     Ischemic ulcer of finger, limited to breakdown of skin (H)     Bilateral lower extremity edema     Heartburn     Current Outpatient Medications   Medication Sig Dispense Refill     amLODIPine (NORVASC) 10 MG tablet TAKE 0.5 TABLETS (5 MG TOTAL) BY MOUTH 2 (TWO) TIMES A DAY. 90 tablet 0     FA/MV,CA,IRON,MIN/LYCOPENE/LUT (MULTIVITAL ORAL) Take by mouth.       hydroCHLOROthiazide (HYDRODIURIL) 12.5 MG tablet TAKE 1 TABLET BY MOUTH EVERY DAY 90 tablet 1     ibuprofen (ADVIL,MOTRIN) 200 MG tablet Take by mouth.       latanoprost (XALATAN) 0.005 % ophthalmic solution        mupirocin (BACTROBAN) 2 % ointment APPLY BY TOPICAL ROUTE 2 TIMES EVERY DAY A SMALL AMOUNT TO THE AFFECTED AREA  1     sildenafil, antihypertensive, (REVATIO) 20 mg tablet TAKE 1 TABLET (20MG) BY MOUTH THREE TIMES DAILY. GENERIC FOR REVATIO. 270 tablet 0     triamcinolone (KENALOG) 0.1 % ointment APPLY BY TOPICAL ROUTE 2 TIMES EVERY DAY A THIN LAYER TO THE AFFECTED AREA(S) 30 g 1     No current facility-administered medications for this visit.      DETAILED EXAMINATION  04/03/19  :  Vitals:    04/03/19 1625   BP: 118/78   Patient Site: Right Arm   Patient Position: Sitting   Cuff Size: Adult Large   Pulse: 80   Weight: 163 lb (73.9 kg)     Alert oriented. Head including the  face is examined for malar rash, heliotropes, scarring, lupus pernio. Eyes examined for redness such as in episcleritis/scleritis, periorbital lesions.   Neck/ Face examined for parotid gland swelling, range of motion of neck.  Left upper and lower and right upper and lower extremities examined for tenderness, swelling, warmth of the appendicular joints, range of motion, edema, rash.  Some of the important findings included: She has a healed ulcer on her right thumb, and on the left thumb 2. She has digit scars from the prior ulcerations, such as on the right index..   There is no sclerodactyly.  She does not have synovitis in any of the palpable joints of upper and lower extremities.  No Joint line tenderness of the knees..    LAB / IMAGING DATA:  ALT   Date Value Ref Range Status   05/09/2017 16 0 - 45 U/L Final     Albumin   Date Value Ref Range Status   05/09/2017 4.0 3.5 - 5.0 g/dL Final     Creatinine   Date Value Ref Range Status   07/25/2018 0.78 0.60 - 1.10 mg/dL Final   05/09/2017 0.76 0.60 - 1.10 mg/dL Final   04/04/2016 0.77 0.60 - 1.10 mg/dL Final       WBC   Date Value Ref Range Status   08/27/2018 6.1 4.0 - 11.0 thou/uL Final   05/09/2017 7.4 4.0 - 11.0 thou/uL Final     Hemoglobin   Date Value Ref Range Status   08/27/2018 13.5 12.0 - 16.0 g/dL Final   05/09/2017 14.1 12.0 - 16.0 g/dL Final   04/04/2016 13.6 12.0 - 16.0 g/dL Final     Platelets   Date Value Ref Range Status   08/27/2018 245 140 - 440 thou/uL Final   05/09/2017 254 140 - 440 thou/uL Final       Lab Results   Component Value Date    SEDRATE 4 09/20/2016

## 2021-05-28 NOTE — TELEPHONE ENCOUNTER
This is a duplicate request.  PA was already completed and was approved.  Please see encounter dated 04/15/67205.

## 2021-05-30 VITALS — BODY MASS INDEX: 27.89 KG/M2 | WEIGHT: 167.6 LBS

## 2021-05-30 VITALS — WEIGHT: 169.4 LBS | BODY MASS INDEX: 28.22 KG/M2 | HEIGHT: 65 IN

## 2021-05-31 VITALS — WEIGHT: 169.3 LBS | BODY MASS INDEX: 28.21 KG/M2 | HEIGHT: 65 IN

## 2021-06-01 VITALS — HEIGHT: 65 IN | BODY MASS INDEX: 27.49 KG/M2 | WEIGHT: 165 LBS

## 2021-06-01 VITALS — WEIGHT: 165 LBS | BODY MASS INDEX: 27.88 KG/M2

## 2021-06-02 VITALS — WEIGHT: 163 LBS | BODY MASS INDEX: 27.55 KG/M2

## 2021-06-02 NOTE — PROGRESS NOTES
Chief Complaint   Patient presents with     Hot Flashes     tick bite this summer         HPI:   Brenda Gunn is a 49 y.o. female c/o feeling achy for a few months.  Had tick bite early June.  Not sure what kind of tick it was.  No rashes.  Has felt hot at times--no sweats.  No shaking chills.    Has taken temp at home occasionally--just above 99.  No red swollen joints.  Hands and ankles are swollen but no change.  No visual problems.  No chest pain. Occasionally has palpations-but not new. A couple times a months--lasts about 30 seconds-no associated symptoms.  No shortness of breath.  Slight cough since last Spring when she had a respiratory infection.  No personal history of asthma.  Occasionally has stomach upset-a little nausea. No vomiting.  No diarrhea.  No weight changes. No blood in stools.  No headaches.    Has Raynaud's syndrome--no change. Gets some tingling with this.    Has had more trouble hanging on to things with her hands--L>R.  Is left handed.  Can't tell if it is weakness--over a long period of time.      Periods have been getting irregular, much lighter. Increased hot flushes    ROS:  A 10 point comprehensive review of systems was negative except as noted.     Medications:  Current Outpatient Medications on File Prior to Visit   Medication Sig Dispense Refill     amLODIPine (NORVASC) 10 MG tablet TAKE 0.5 TABLETS (5 MG TOTAL) BY MOUTH 2 (TWO) TIMES A DAY. 90 tablet 0     FA/MV,CA,IRON,MIN/LYCOPENE/LUT (MULTIVITAL ORAL) Take by mouth.       hydroCHLOROthiazide (HYDRODIURIL) 12.5 MG tablet TAKE 1 TABLET BY MOUTH EVERY DAY 90 tablet 3     ibuprofen (ADVIL,MOTRIN) 200 MG tablet Take by mouth.       latanoprost (XALATAN) 0.005 % ophthalmic solution        mupirocin (BACTROBAN) 2 % ointment APPLY BY TOPICAL ROUTE 2 TIMES EVERY DAY A SMALL AMOUNT TO THE AFFECTED AREA  1     NON FORMULARY Thrive multi vitamin       sildenafil, antihypertensive, (REVATIO) 20 mg tablet TAKE 1 TABLET (20MG) BY MOUTH  THREE TIMES DAILY. GENERIC FOR REVATIO. 270 tablet 0     triamcinolone (KENALOG) 0.1 % ointment APPLY BY TOPICAL ROUTE 2 TIMES EVERY DAY A THIN LAYER TO THE AFFECTED AREA(S) 30 g 1     No current facility-administered medications on file prior to visit.          Social History:  Social History     Tobacco Use     Smoking status: Never Smoker     Smokeless tobacco: Never Used   Substance Use Topics     Alcohol use: Not on file         Physical Exam:   Vitals:    10/16/19 1019   BP: 118/80   Pulse: 80   Resp: 12   Temp: 98.2  F (36.8  C)   TempSrc: Oral       GENERAL:   Alert. Oriented.  EYES: Clear  HENT:  Ears: R TM pearly gray. Normal landmarks. L TM pearly gray.  Normal landmarks  Nose: Clear.  Sinuses: Nontender.  Oropharynx:  No erythema. No exudate.  NECK: Supple. No adenopathy.  LUNGS: Clear to ascultation.  No crackles.  No wheezing  HEART: RRR  SKIN:  No rash.   ABDOMEN:  +BS. No tenderness. Soft, no guarding, rebound, rigidity,mass, or organomegaly. No CVA tenderness    MS:  Joints with FROM.  No erythema.  No synovitis.  NEURO:  CN intact.  Sensation intact.  No focal weakness.      LABS:  Results for orders placed or performed in visit on 10/16/19   HM2(CBC w/o Differential)   Result Value Ref Range    WBC 6.4 4.0 - 11.0 thou/uL    RBC 4.17 3.80 - 5.40 mill/uL    Hemoglobin 13.6 12.0 - 16.0 g/dL    Hematocrit 41.2 35.0 - 47.0 %    MCV 99 80 - 100 fL    MCH 32.7 27.0 - 34.0 pg    MCHC 33.0 32.0 - 36.0 g/dL    RDW 11.3 11.0 - 14.5 %    Platelets 242 140 - 440 thou/uL    MPV 7.7 7.0 - 10.0 fL        Assessment/Plan:    1. Myalgia  HM2(CBC w/o Differential)    Comprehensive Metabolic Panel    Thyroid [place]    Lyme Antibody Cascade   2. Screen for colon cancer  Ambulatory referral for Colonoscopy   3. Visit for screening mammogram  Mammo Screening Bilateral   4. Encounter for screening for HIV  HIV Antigen/Antibody Screening Cascade      Generalized myalgia with no other symptoms.  No specific findings on  exam.  Does relate a tick bite earlier this summer, but uncertain what kind of tick.  Has History of severe Raynaud's but no documented connective tissue disease.  Will check labs as noted.  She has appointment with rheumatology in about two months.  Recheck before that if increasing problems.             Libby Silva MD      10/16/2019    The following portions of the patient's history were reviewed and updated as appropriate: allergies, current medications, past family history, past medical history, past social history, past surgical history and problem list.

## 2021-06-03 VITALS
WEIGHT: 166 LBS | HEART RATE: 84 BPM | BODY MASS INDEX: 28.05 KG/M2 | SYSTOLIC BLOOD PRESSURE: 130 MMHG | DIASTOLIC BLOOD PRESSURE: 80 MMHG

## 2021-06-04 NOTE — PROGRESS NOTES
ASSESSMENT AND PLAN:  Brenda Gunn 49 y.o. female is here for follow-up.  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  Has done well with sildenafil, amlodipine combination.  She recently had fingertip, middle, that has healed leaving scars.  Continue the current regimen.  Meet here in 6 months or sooner.           Diagnoses and all orders for this visit:    Raynaud's disease without gangrene  -     amLODIPine (NORVASC) 10 MG tablet; Take 0.5 tablets (5 mg total) by mouth 2 (two) times a day.  Dispense: 90 tablet; Refill: 0  -     sildenafil (REVATIO) 20 mg tablet; TAKE 1 TABLET (20MG) BY MOUTH THREE TIMES DAILY. GENERIC FOR REVATIO.  Dispense: 180 tablet; Refill: 0    Ischemic ulcer of finger, limited to breakdown of skin (H)  -     sildenafil (REVATIO) 20 mg tablet; TAKE 1 TABLET (20MG) BY MOUTH THREE TIMES DAILY. GENERIC FOR REVATIO.  Dispense: 180 tablet; Refill: 0      HISTORY OF PRESENTING ILLNESS:  Brenda Gunn, 49 y.o., female is here for follow-up of severe Raynaud's disease associated with digital ulceration, she reports mild discomfort intermittently in the digits especially weather turns cold.  She has not had swollen joints during this time more painful joints.  She had infected her left middle finger nail fold.  This is happened before.  Now improving with antibiotics, topically.  She rated the pain.  Moderately severe, 4.0/10.  She is able to do all her day-to-day activities without difficulty.  Morning stiffness is no more than 5 minutes.  She has had healing of the ulcers that she developed during the winter months. There is no fever or weight loss blurry vision eye redness mouth also she has had nausea there is no rash.  She does not have photosensitivity.  She still gets heartburn.  When she was on amlodipine twice daily, 10 mg, she had edema of the lower extremities.  She has has not had repeated miscarriages, DVT PE,  "seizures or kidney issues.  She noted no shortness of breath.  She has not had heartburn.  She has not noted a \"red spots such as in her buccal mucosa and lips or tongue.  She is not a smoker.  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  During the summer months this issue was not that significant.  This is interfering now with many of her day-to-day activities.  Mom is said to have arthritis, osteoporosis and a grandmother rheumatoid arthritis.Further historical information and ADL limitations as noted in the multidimensional health assessment questionnaire attached in the EMR.    ALLERGIES:Ortho tri-cyclen (21)    PAST MEDICAL/ACTIVE PROBLEMS/MEDICATION/ FAMILY HISTORY/SOCIAL DATA:  The patient has a family history of  No past medical history on file.  Social History     Tobacco Use   Smoking Status Never Smoker   Smokeless Tobacco Never Used     Patient Active Problem List   Diagnosis     Raynaud's disease without gangrene     Ischemic ulcer of finger, limited to breakdown of skin (H)     Bilateral lower extremity edema     Heartburn     Current Outpatient Medications   Medication Sig Dispense Refill     amLODIPine (NORVASC) 10 MG tablet TAKE 0.5 TABLETS (5 MG TOTAL) BY MOUTH 2 (TWO) TIMES A DAY. 90 tablet 0     FA/MV,CA,IRON,MIN/LYCOPENE/LUT (MULTIVITAL ORAL) Take by mouth.       hydroCHLOROthiazide (HYDRODIURIL) 12.5 MG tablet TAKE 1 TABLET BY MOUTH EVERY DAY 90 tablet 3     ibuprofen (ADVIL,MOTRIN) 200 MG tablet Take by mouth.       latanoprost (XALATAN) 0.005 % ophthalmic solution        mupirocin (BACTROBAN) 2 % ointment APPLY BY TOPICAL ROUTE 2 TIMES EVERY DAY A SMALL AMOUNT TO THE AFFECTED AREA  1     NON FORMULARY Thrive multi vitamin       sildenafil (REVATIO) 20 mg tablet TAKE 1 TABLET (20MG) BY MOUTH THREE TIMES DAILY. GENERIC FOR REVATIO. 180 tablet 0     triamcinolone (KENALOG) 0.1 % ointment APPLY BY TOPICAL ROUTE 2 TIMES EVERY DAY A THIN LAYER TO THE AFFECTED AREA(S) 30 g " 1     No current facility-administered medications for this visit.      DETAILED EXAMINATION  12/11/19  :  Vitals:    12/11/19 1644   BP: 130/80   Patient Site: Right Arm   Patient Position: Sitting   Cuff Size: Adult Large   Pulse: 84   Weight: 166 lb (75.3 kg)     Alert oriented. Head including the face is examined for malar rash, heliotropes, scarring, lupus pernio. Eyes examined for redness such as in episcleritis/scleritis, periorbital lesions.   Neck/ Face examined for parotid gland swelling, range of motion of neck.  Left upper and lower and right upper and lower extremities examined for tenderness, swelling, warmth of the appendicular joints, range of motion, edema, rash.  Some of the important findings included: There is no ulceration currently she has various finger pulp areas where she has had scars from prior ulcers most prominently now in her left middle finger tip.  There is no sclerodactyly no tightening of the skin on the face sternal area, or proximal upper extremities.        LAB / IMAGING DATA:  ALT   Date Value Ref Range Status   10/16/2019 37 0 - 45 U/L Final   04/03/2019 34 0 - 45 U/L Final   05/09/2017 16 0 - 45 U/L Final     Albumin   Date Value Ref Range Status   10/16/2019 4.0 3.5 - 5.0 g/dL Final   04/03/2019 4.3 3.5 - 5.0 g/dL Final   05/09/2017 4.0 3.5 - 5.0 g/dL Final     Creatinine   Date Value Ref Range Status   10/16/2019 0.73 0.60 - 1.10 mg/dL Final   04/03/2019 0.80 0.60 - 1.10 mg/dL Final   07/25/2018 0.78 0.60 - 1.10 mg/dL Final       WBC   Date Value Ref Range Status   10/16/2019 6.4 4.0 - 11.0 thou/uL Final   04/03/2019 7.0 4.0 - 11.0 thou/uL Final     Hemoglobin   Date Value Ref Range Status   10/16/2019 13.6 12.0 - 16.0 g/dL Final   04/03/2019 13.3 12.0 - 16.0 g/dL Final   08/27/2018 13.5 12.0 - 16.0 g/dL Final     Platelets   Date Value Ref Range Status   10/16/2019 242 140 - 440 thou/uL Final   04/03/2019 268 140 - 440 thou/uL Final   08/27/2018 245 140 - 440 thou/uL Final        Lab Results   Component Value Date    SEDRATE 4 09/20/2016

## 2021-06-07 ENCOUNTER — COMMUNICATION - HEALTHEAST (OUTPATIENT)
Dept: RHEUMATOLOGY | Facility: CLINIC | Age: 51
End: 2021-06-07

## 2021-06-07 NOTE — TELEPHONE ENCOUNTER
Dr. Katz patient    Patient called. She is requesting a letter excusing her from work because of her   Raynaud's disease and how her immune is compromised because of the outbreak.     Brenda @ [phone]

## 2021-06-07 NOTE — TELEPHONE ENCOUNTER
Per Dr Katz- he can write a letter stating this. Pt notified and will print the letter from home at send it to her employer

## 2021-06-07 NOTE — TELEPHONE ENCOUNTER
Pt would like a note stating that she can work but not in  as pt is concerned with infection being exposed for herself and for her son that has asthma. Pt is para and works at a school, pt states there is other jobs she can do other than  and wants to work just not in . Pt states she did  for two days and was so stressed her Raynauds did flare and fingers were purple. Pt is still taking sildenafil and amlodipine. Pt does not have any open ulcers at this time.

## 2021-06-08 NOTE — TELEPHONE ENCOUNTER
Refilled per Rheum RN refill protocol  Asked schedulers to call to schedule f/u    Today's advice/conversation is in the additional context of the current extreme COVID-19 pandemic circumstances.

## 2021-06-08 NOTE — TELEPHONE ENCOUNTER
PA APPROVED:    Approval start date: 05/14/2020  Approval end date:  05/14/2021    Pharmacy has been notified of approval and will contact patient when medication is ready for pickup.

## 2021-06-08 NOTE — TELEPHONE ENCOUNTER
Central PA team  [phone]  Pool: [employer] PA MED ([id number])          PA has been initiated.       PA form completed and faxed insurance via Cover My Meds     Key:  [id number]     Medication:  Sildenafil Citrate 20MG tablets    Insurance:  P1        Response will be received via fax and may take up to 5-10 business days depending on plan

## 2021-06-08 NOTE — TELEPHONE ENCOUNTER
Refill Approved    Rx renewed per Medication Renewal Policy. Medication was last renewed on 4/9/19.    Molly Nieves, [facility] Triage/Med Refill 6/18/2020     Requested Prescriptions   Pending Prescriptions Disp Refills     hydroCHLOROthiazide (HYDRODIURIL) 12.5 MG tablet [Pharmacy Med Name: HYDROCHLOROTHIAZIDE 12.5MG TABS] 90 tablet 0     Sig: TAKE ONE TABLET BY MOUTH ONCE DAILY       Diuretics/Combination Diuretics Refill Protocol  Passed - 6/16/2020 10:43 AM        Passed - Visit with PCP or prescribing provider visit in past 12 months     Last office visit with prescriber/PCP: 5/16/2016 Kathryn Samuels MD OR same dept: 10/16/2019 Libby Silva MD OR same specialty: 10/16/2019 Libby Silva MD  Last physical: 7/25/2018 Last MTM visit: Visit date not found   Next visit within 3 mo: Visit date not found  Next physical within 3 mo: Visit date not found  Prescriber OR PCP: Kathryn Samuels MD  Last diagnosis associated with med order: 1. Hand swelling  - hydroCHLOROthiazide (HYDRODIURIL) 12.5 MG tablet [Pharmacy Med Name: HYDROCHLOROTHIAZIDE 12.5MG TABS]; TAKE ONE TABLET BY MOUTH ONCE DAILY  Dispense: 90 tablet; Refill: 0    If protocol passes may refill for 12 months if within 3 months of last provider visit (or a total of 15 months).             Passed - Serum Potassium in past 12 months      Lab Results   Component Value Date    Potassium 4.5 10/16/2019             Passed - Serum Sodium in past 12 months      Lab Results   Component Value Date    Sodium 137 10/16/2019             Passed - Blood pressure on file in past 12 months     BP Readings from Last 1 Encounters:   12/11/19 130/80             Passed - Serum Creatinine in past 12 months      Creatinine   Date Value Ref Range Status   10/16/2019 0.73 0.60 - 1.10 mg/dL Final

## 2021-06-08 NOTE — PROGRESS NOTES
"ASSESSMENT AND PLAN:  Brenda Gunn 46 y.o. 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  Her edema has lessened in the lower extremity though she is still taking the same amount of amlodipine.  There are no features to suggest scleroderma.  Continue is now returning for follow-up in the next 3 months.      Diagnoses and all orders for this visit:    Raynaud's disease without gangrene    Ischemic ulcer of finger, limited to breakdown of skin    Bilateral lower extremity edema    HISTORY OF PRESENTING ILLNESS:  Brenda Gunn, 46 y.o., female is here for evaluation of discoloration of her digits.  This is happening on both sides, especially in her index and middle finger and thumb.  This began earlier this year in February.  This came out of the blue.  She describes textbook picture of Raynauds with cold-induced paler, numbness and then changing over to cyanotic bluish discoloration.  She's had ulceration.  At one point she had infection that took several weeks of antibiotics on one of the digits.  She's been on amlodipine 10 mg and has helped only partially effective at all this is however been associated with edema of the lower extremities.  This patient does not recognize any new findings during this time such as mouth ulcers, photosensitivity, pleuritic symptoms, new joint pains though she does feel achy sometimes but unable to identify particular joint.  She has has not had repeated miscarriages, DVT PE, seizures or kidney issues.  She noted no shortness of breath.  She has not had heartburn.  She has not noted a \"red spots such as in her buccal mucosa and lips or tongue.  She is not a smoker.  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  During the summer months this issue was not that significant.  This is interfering now with many of her day-to-day activities.  Mom is said " to have arthritis, osteoporosis and a grandmother rheumatoid arthritis.Further historical information and ADL limitations as noted in the multidimensional health assessment questionnaire attached in the EMR.    ALLERGIES:Ortho tri-cyclen (21)    PAST MEDICAL/ACTIVE PROBLEMS/MEDICATION/ FAMILY HISTORY/SOCIAL DATA:  The patient has a family history of  No past medical history on file.  History   Smoking Status     Never Smoker   Smokeless Tobacco     Not on file     Patient Active Problem List   Diagnosis     Raynaud's disease without gangrene     Ischemic ulcer of finger, limited to breakdown of skin     Bilateral lower extremity edema     Current Outpatient Prescriptions   Medication Sig Dispense Refill     amLODIPine (NORVASC) 10 MG tablet Take 1 tablet (10 mg total) by mouth daily. 90 tablet 3     calcium carbonate-vitamin D3 500 mg(1,250mg) -125 unit per tablet Take by mouth.       FA/MV,CA,IRON,MIN/LYCOPENE/LUT (MULTIVITAL ORAL) Take by mouth.       ibuprofen (ADVIL,MOTRIN) 200 MG tablet Take by mouth.       mupirocin (BACTROBAN) 2 % ointment APPLY BY TOPICAL ROUTE 2 TIMES EVERY DAY A SMALL AMOUNT TO THE AFFECTED AREA  1     triamcinolone (KENALOG) 0.1 % ointment APPLY BY TOPICAL ROUTE 2 TIMES EVERY DAY A THIN LAYER TO THE AFFECTED AREA(S)  1     sildenafil (REVATIO) 20 mg tablet Take 1 tablet (20 mg total) by mouth 3 (three) times a day. 90 tablet 0     No current facility-administered medications for this visit.      DETAILED EXAMINATION:  Vitals:    01/09/17 1505   BP: 124/84   Patient Site: Left Arm   Patient Position: Sitting   Cuff Size: Adult Regular   Pulse: 64   Weight: 167 lb 9.6 oz (76 kg)    Comfortable.  Alert oriented.  Eyes are without inflammatory changes.  Examination of both upper and lower extremities is performed for swollen & tender joints, range of motion, rash, weakness, discoloration, warmth, swelling.  The skin examined for nodules. The salient normal / abnormal findings are appended.    She has bluish discoloration of both index fingers especially in the right hand, she has healed ulceration on her left index finger tip leaving a pitted area and a healed area with pitting on the right index finger.  She does not have sclerodactyly.  She does not have synovitis in any of the palpable appendicular joints.  She does not have skin tightening and other any other area except lower extremity which is due to the edema that she has bilaterally.  She does not have pleuropericardial rub or abnormal lung sounds.  She does not have telangiectasias such as on her oral mucosa and labial surfaces or lingual surface.    LAB / IMAGING DATA:  CREATININE   Date Value Ref Range Status   04/04/2016 0.77 0.60 - 1.10 mg/dL Final       HEMOGLOBIN   Date Value Ref Range Status   04/04/2016 13.6 12.0 - 16.0 g/dL Final       Lab Results   Component Value Date    SEDRATE 4 09/20/2016

## 2021-06-08 NOTE — PROGRESS NOTES
"Brenda Gunn is a 49 y.o. female who is being evaluated via a billable video visit.      The patient has been notified of following:     \"This video visit will be conducted via a call between you and your physician/provider. We have found that certain health care needs can be provided without the need for an in-person physical exam.  This service lets us provide the care you need with a video conversation.  If a prescription is necessary we can send it directly to your pharmacy.  If lab work is needed we can place an order for that and you can then stop by our lab to have the test done at a later time.    Video visits are billed at different rates depending on your insurance coverage. Please reach out to your insurance provider with any questions.    If during the course of the call the physician/provider feels a video visit is not appropriate, you will not be charged for this service.\"    Patient has given verbal consent to a Video visit? Yes    Patient would like to receive their AVS by AVS Preference: Kendy.    Patient would like the video invitation sent by: Text to cell phone: [phone]    Will anyone else be joining your video visit? No          Video-Visit Details    Type of service:  Video Visit    Originating Location (pt. Location): Home    Distant Location (provider location):  [facility]     Platform used for Video Visit: [facility]      ASSESSMENT AND PLAN:    Diagnoses and all orders for this visit:    Raynaud's disease without gangrene  -     amLODIPine (NORVASC) 10 MG tablet; Take 0.5 tablets (5 mg total) by mouth 2 (two) times a day.  Dispense: 90 tablet; Refill: 0    Ischemic ulcer of finger, limited to breakdown of skin (H)          HISTORY OF PRESENTING ILLNESS:  Brenda Gunn 49 y.o. is evaluated here via video link.  For follow-up.  She has Rafal's disease.  She has had ulceration of her fingertips.  She has no autoantibodies such as SIMONE, SCL 70, anticentromere.  She did " have heartburn.  There is been no sclerodactyly.  She has not had joint symptoms.  Her ulceration is healed with the current combination of sildenafil and amlodipine.  Especially the weather improving her symptoms have continued to be less troublesome for her.  She is going to stay the course will meet here in 6 months.   ROS enquiry held for fever, ocular symptoms, rash, headache,  GI issues.  Today we also discussed the issues related to the current pandemic, the pros and cons of the current treatment plan, the CDC guidelines such as social distancing washing the hands covering the cough.  ALLERGIES:Ortho tri-cyclen (21)    PAST MEDICAL/ACTIVE PROBLEMS/MEDICATION/SOCIAL DATA  No past medical history on file.  Social History     Tobacco Use   Smoking Status Never Smoker   Smokeless Tobacco Never Used     Patient Active Problem List   Diagnosis     Raynaud's disease without gangrene     Ischemic ulcer of finger, limited to breakdown of skin (H)     Bilateral lower extremity edema     Heartburn     Current Outpatient Medications   Medication Sig Dispense Refill     amLODIPine (NORVASC) 10 MG tablet TAKE ONE-HALF TABLET BY MOUTH TWICE DAILY 90 tablet 0     FA/MV,CA,IRON,MIN/LYCOPENE/LUT (MULTIVITAL ORAL) Take by mouth.       hydroCHLOROthiazide (HYDRODIURIL) 12.5 MG tablet TAKE 1 TABLET BY MOUTH EVERY DAY 90 tablet 3     ibuprofen (ADVIL,MOTRIN) 200 MG tablet Take by mouth.       latanoprost (XALATAN) 0.005 % ophthalmic solution        mupirocin (BACTROBAN) 2 % ointment APPLY BY TOPICAL ROUTE 2 TIMES EVERY DAY A SMALL AMOUNT TO THE AFFECTED AREA  1     NON FORMULARY Thrive multi vitamin       sildenafil (REVATIO) 20 mg tablet TAKE ONE TABLET BY MOUTH THREE TIMES A  tablet 0     triamcinolone (KENALOG) 0.1 % ointment APPLY BY TOPICAL ROUTE 2 TIMES EVERY DAY A THIN LAYER TO THE AFFECTED AREA(S) 30 g 1     No current facility-administered medications for this visit.          EXAMINATION:    Using the audio and  video link as best as possible the constitutional, neck, neurologic, psych, skin, both upper extremities areas/organ system were evaluated during this assessment.  Some of the important findings:She is scars from previous ulceration such as the right thumb, without any of the 10 digits showing new ulcers, no inflammation of the small joints, fully able to abduct her shoulders.      LAB / IMAGING DATA:  ALT   Date Value Ref Range Status   10/16/2019 37 0 - 45 U/L Final   04/03/2019 34 0 - 45 U/L Final   05/09/2017 16 0 - 45 U/L Final     Albumin   Date Value Ref Range Status   10/16/2019 4.0 3.5 - 5.0 g/dL Final   04/03/2019 4.3 3.5 - 5.0 g/dL Final   05/09/2017 4.0 3.5 - 5.0 g/dL Final     Creatinine   Date Value Ref Range Status   10/16/2019 0.73 0.60 - 1.10 mg/dL Final   04/03/2019 0.80 0.60 - 1.10 mg/dL Final   07/25/2018 0.78 0.60 - 1.10 mg/dL Final       WBC   Date Value Ref Range Status   10/16/2019 6.4 4.0 - 11.0 thou/uL Final   04/03/2019 7.0 4.0 - 11.0 thou/uL Final     Hemoglobin   Date Value Ref Range Status   10/16/2019 13.6 12.0 - 16.0 g/dL Final   04/03/2019 13.3 12.0 - 16.0 g/dL Final   08/27/2018 13.5 12.0 - 16.0 g/dL Final     Platelets   Date Value Ref Range Status   10/16/2019 242 140 - 440 thou/uL Final   04/03/2019 268 140 - 440 thou/uL Final   08/27/2018 245 140 - 440 thou/uL Final       Lab Results   Component Value Date    SEDRATE 4 09/20/2016     Duration of the call:6  Minutes  Call start: 311  pm  Call end:   318pm

## 2021-06-09 ENCOUNTER — COMMUNICATION - HEALTHEAST (OUTPATIENT)
Dept: ADMINISTRATIVE | Facility: CLINIC | Age: 51
End: 2021-06-09

## 2021-06-10 NOTE — PROGRESS NOTES
ASSESSMENT AND PLAN:  Brenda Gunn 46 y.o. 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  She is going to reduce the dose of amlodipine.  Will go down gradually to 10 mg a day from 20 currently.  She has noted heartburn recently has been taking over-the-counter measures.  She has ever so slight hint of thickening in the distal digits.  There are no definite features to suggest scleroderma.  Continue is now returning for follow-up in the next 4 months.      Diagnoses and all orders for this visit:    Raynaud's disease without gangrene  -     sildenafil (REVATIO) 20 mg tablet; TAKE 1 TABLET (20 MG TOTAL) BY MOUTH 3 (THREE) TIMES A DAY.  Dispense: 270 tablet; Refill: 1  -     TOÑO (Antibodies to Extractable Nuclear Antigens) Profile  -     Centromere Antibodies, IgG    Ischemic ulcer of finger, limited to breakdown of skin  -     sildenafil (REVATIO) 20 mg tablet; TAKE 1 TABLET (20 MG TOTAL) BY MOUTH 3 (THREE) TIMES A DAY.  Dispense: 270 tablet; Refill: 1    Heartburn      HISTORY OF PRESENTING ILLNESS:  Brenda Gunn, 46 y.o., female is here for  follow-up of re-nods, severe with ulcerations this is happening on both sides, especially in her index and middle finger and thumb.  This began earlier this year in February.  This came out of the blue.  She describes textbook picture of Raynauds with cold-induced paler, numbness and then changing over to cyanotic bluish discoloration.  She's had ulceration.  At one point she had infection that took several weeks of antibiotics on one of the digits.  She's been on amlodipine 10 mg and has helped only partially effective at all this is however been associated with edema of the lower extremities.  This patient does not recognize any new findings during this time such as mouth ulcers, photosensitivity,  "pleuritic symptoms, new joint pains though she does feel achy sometimes but unable to identify particular joint.  She has has not had repeated miscarriages, DVT PE, seizures or kidney issues.  She noted no shortness of breath.  She has not had heartburn.  She has not noted a \"red spots such as in her buccal mucosa and lips or tongue.  She is not a smoker.  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  During the summer months this issue was not that significant.  This is interfering now with many of her day-to-day activities.  Mom is said to have arthritis, osteoporosis and a grandmother rheumatoid arthritis.Further historical information and ADL limitations as noted in the multidimensional health assessment questionnaire attached in the EMR.    ALLERGIES:Ortho tri-cyclen (21)    PAST MEDICAL/ACTIVE PROBLEMS/MEDICATION/ FAMILY HISTORY/SOCIAL DATA:  The patient has a family history of  No past medical history on file.  History   Smoking Status     Never Smoker   Smokeless Tobacco     Not on file     Patient Active Problem List   Diagnosis     Raynaud's disease without gangrene     Ischemic ulcer of finger, limited to breakdown of skin     Bilateral lower extremity edema     Current Outpatient Prescriptions   Medication Sig Dispense Refill     amLODIPine (NORVASC) 10 MG tablet TAKE 1 TABLET (10 MG TOTAL) BY MOUTH DAILY. 90 tablet 0     calcium carbonate-vitamin D3 500 mg(1,250mg) -125 unit per tablet Take by mouth.       FA/MV,CA,IRON,MIN/LYCOPENE/LUT (MULTIVITAL ORAL) Take by mouth.       ibuprofen (ADVIL,MOTRIN) 200 MG tablet Take by mouth.       mupirocin (BACTROBAN) 2 % ointment APPLY BY TOPICAL ROUTE 2 TIMES EVERY DAY A SMALL AMOUNT TO THE AFFECTED AREA  1     sildenafil (REVATIO) 20 mg tablet TAKE 1 TABLET (20 MG TOTAL) BY MOUTH 3 (THREE) TIMES A DAY. 270 tablet 1     triamcinolone (KENALOG) 0.1 % ointment APPLY BY TOPICAL ROUTE 2 TIMES EVERY DAY A THIN LAYER TO THE AFFECTED AREA(S)  1     No current " "facility-administered medications for this visit.      DETAILED EXAMINATION:  Vitals:    05/09/17 1441   BP: 114/68   Patient Site: Right Arm   Patient Position: Sitting   Cuff Size: Adult Regular   Pulse: 72   Weight: 169 lb 6.4 oz (76.8 kg)   Height: 5' 5\" (1.651 m)    Comfortable.  Alert oriented.  Eyes are without inflammatory changes.  Examination of both upper and lower extremities is performed for swollen & tender joints, range of motion, rash, weakness, discoloration, warmth, swelling.  The skin examined for nodules. The salient normal / abnormal findings are appended.  Her ulcerations are healed and the digits except the right thumb which is better than before but not fully healed.  She has subtle tightness of the digits skin distally.  She has minimal edema in the lower extremities.  There are no telangiectasias such as on the tongue.  LAB / IMAGING DATA:  Creatinine   Date Value Ref Range Status   04/04/2016 0.77 0.60 - 1.10 mg/dL Final       Hemoglobin   Date Value Ref Range Status   04/04/2016 13.6 12.0 - 16.0 g/dL Final       Lab Results   Component Value Date    SEDRATE 4 09/20/2016          "

## 2021-06-10 NOTE — TELEPHONE ENCOUNTER
PA is not needed.  Information below provided to pharmacy.       [employer]erred[employer] Request #: [id number]  [employer] Tracking Number: [id number] Patient Name: Brenda Gunn  Practitioner: Aura Katz MBBS  Contact Name: Carmina  Contact Phone: [phone]  Auth Status: Other  Comments: This patient currently has an authorization in place for this medication and strength through 5/14/2021. Her pharmacy processed a 30 day supply on 7/14. Test claim shows this will process through fine. If she goes through [facility], they will ship it.

## 2021-06-11 ENCOUNTER — COMMUNICATION - HEALTHEAST (OUTPATIENT)
Dept: ADMINISTRATIVE | Facility: CLINIC | Age: 51
End: 2021-06-11

## 2021-06-11 NOTE — TELEPHONE ENCOUNTER
PA is not needed    PA is not needed.  Information below provided to pharmacy.         [employer]erred[employer] Request #: [id number]  [employer] Tracking Number: [id number] Patient Name: Brenda Millerpaulconsuelo  Practitioner: Aura Katz MBBS  Contact Name: Carmina  Contact Phone: [phone]  Auth Status: Other  Comments: This patient currently has an authorization in place for this medication and strength through 5/14/2021.  Test claim shows this will process through fine. If she goes through [facility], they will ship it.

## 2021-06-11 NOTE — TELEPHONE ENCOUNTER
Central PA team  [phone]  Pool: HE PA MED ([id number])          PA has been initiated.       PA form completed and faxed insurance via Cover My Meds     Key:  [id number]     Medication:  SILDENAFIL    Insurance:  [employer] [facility]        Response will be received via fax and may take up to 5-10 business days depending on plan

## 2021-06-11 NOTE — TELEPHONE ENCOUNTER
Per [facility]- pt has new insurance and is needing a new PA.   New insurance phone number is [phone]    Please start PA for sildenafil

## 2021-06-12 NOTE — TELEPHONE ENCOUNTER
10/06 - called x 1 - pt states that her insurance switched back to preferred one. She will call [facility].

## 2021-06-12 NOTE — TELEPHONE ENCOUNTER
[facility] @ [phone] option 4     They have been unable to get a hold of pt. If no return call from her, they will close case by the end of week.

## 2021-06-13 NOTE — TELEPHONE ENCOUNTER
I sent a 1 month refill of this patient's medication.  They will need to be seen prior to further refills being prescribed.     They should call [phone] to get scheduled at the [facility].       Thanks!    BB

## 2021-06-13 NOTE — PROGRESS NOTES
1st attempt   LVMT[facility]- to go through rooming questions. Will try again later     Georgina Kidd CMA [facility] 11/19/2020 2:24 PM

## 2021-06-13 NOTE — PROGRESS NOTES
"Brenda Gunn is a 50 y.o. female who is being evaluated via a billable video visit.      The patient has been notified of following:     \"This video visit will be conducted via a call between you and your physician/provider. We have found that certain health care needs can be provided without the need for an in-person physical exam.  This service lets us provide the care you need with a video conversation.  If a prescription is necessary we can send it directly to your pharmacy.  If lab work is needed we can place an order for that and you can then stop by our lab to have the test done at a later time.    Video visits are billed at different rates depending on your insurance coverage. Please reach out to your insurance provider with any questions.    If during the course of the call the physician/provider feels a video visit is not appropriate, you will not be charged for this service.\"    Patient has given verbal consent to a Video visit? Yes  How would you like to obtain your AVS? AVS Preference: MyChart.  If dropped by the video visit, the video invitation should be sent to: Text to cell phone: [phone]  Will anyone else be joining your video visit? No        Video-Visit Details    Type of service:  Video Visit      Originating Location (pt. Location): Home    Distant Location (provider location):  [facility]     Platform used for Video Visit: [employer]    This document was created using a software with less than 100% fidelity, at times resulting in unintended, even erroneous syntax and grammar.  The reader is advised to keep this under consideration while reviewing, interpreting this note.    ASSESSMENT AND PLAN:    Diagnoses and all orders for this visit:    Raynaud's disease without gangrene  -     amLODIPine (NORVASC) 10 MG tablet; TAKE ONE-HALF TABLET BY MOUTH TWICE A DAY  Dispense: 90 tablet; Refill: 0          HISTORY OF PRESENTING ILLNESS:  Brenda Gunn 50 y.o. is evaluated " here via video link.  This is for follow-up of Raynaud's.  She is on sildenafil, amlodipine.  So far this year she has not had any significant issues.  She does get heartburn for which she takes over-the-counter measures.  She has not noticed thickening of the skin.  There are no mouth ulcers.  No joint pains.  Her autoantibody profile was negative for SIMONE, SCL 70, and negative anticentromere.  She noted no shortness of breath.  No edema of the ankles.   ROS enquiry held for fever, ocular symptoms, rash, headache,  GI issues.  Today we also discussed the issues related to the current pandemic, the pros and cons of the current treatment plan, the CDC guidelines such as social distancing washing the hands covering the cough.  ALLERGIES:Ortho tri-cyclen (21)    PAST MEDICAL/ACTIVE PROBLEMS/MEDICATION/SOCIAL DATA  No past medical history on file.  Social History     Tobacco Use   Smoking Status Never Smoker   Smokeless Tobacco Never Used     Patient Active Problem List   Diagnosis     Raynaud's disease without gangrene     Ischemic ulcer of finger, limited to breakdown of skin (H)     Bilateral lower extremity edema     Heartburn     Current Outpatient Medications   Medication Sig Dispense Refill     amLODIPine (NORVASC) 10 MG tablet TAKE ONE-HALF TABLET BY MOUTH TWICE A DAY 90 tablet 0     FA/MV,CA,IRON,MIN/LYCOPENE/LUT (MULTIVITAL ORAL) Take by mouth.       hydroCHLOROthiazide (HYDRODIURIL) 12.5 MG tablet TAKE ONE TABLET BY MOUTH ONCE DAILY 90 tablet 1     ibuprofen (ADVIL,MOTRIN) 200 MG tablet Take by mouth.       latanoprost (XALATAN) 0.005 % ophthalmic solution        mupirocin (BACTROBAN) 2 % ointment APPLY BY TOPICAL ROUTE 2 TIMES EVERY DAY A SMALL AMOUNT TO THE AFFECTED AREA  1     NON FORMULARY Thrive multi vitamin       sildenafil (REVATIO) 20 mg tablet TAKE ONE TABLET BY MOUTH THREE TIMES A DAY 90 tablet 0     triamcinolone (KENALOG) 0.1 % ointment APPLY BY TOPICAL ROUTE 2 TIMES EVERY DAY A THIN LAYER TO THE  AFFECTED AREA(S) 30 g 1     No current facility-administered medications for this visit.          EXAMINATION:    Using the audio and video link as best as possible the constitutional, neck, neurologic, psych, skin, both upper extremities areas/organ system were evaluated during this assessment.  Some of the important findings: Alert, oriented, speech fluent.   Able to fully flex the digits, into fists bilaterally, wrist and elbow elbow range of motion appear normal, abduction of the shoulder is normal.  No loss of skin, ulceration on the fingertips but she has scars from prior ulceration.      LAB / IMAGING DATA:  ALT   Date Value Ref Range Status   11/16/2020 28 0 - 45 U/L Final   10/16/2019 37 0 - 45 U/L Final   04/03/2019 34 0 - 45 U/L Final     Albumin   Date Value Ref Range Status   11/16/2020 4.2 3.5 - 5.0 g/dL Final   10/16/2019 4.0 3.5 - 5.0 g/dL Final   04/03/2019 4.3 3.5 - 5.0 g/dL Final     Creatinine   Date Value Ref Range Status   11/16/2020 0.78 0.60 - 1.10 mg/dL Final   10/16/2019 0.73 0.60 - 1.10 mg/dL Final   04/03/2019 0.80 0.60 - 1.10 mg/dL Final       WBC   Date Value Ref Range Status   11/16/2020 6.0 4.0 - 11.0 thou/uL Final   10/16/2019 6.4 4.0 - 11.0 thou/uL Final     Hemoglobin   Date Value Ref Range Status   11/16/2020 13.7 12.0 - 16.0 g/dL Final   10/16/2019 13.6 12.0 - 16.0 g/dL Final   04/03/2019 13.3 12.0 - 16.0 g/dL Final     Platelets   Date Value Ref Range Status   11/16/2020 241 140 - 440 thou/uL Final   10/16/2019 242 140 - 440 thou/uL Final   04/03/2019 268 140 - 440 thou/uL Final       Lab Results   Component Value Date    SEDRATE 4 09/20/2016     Duration of the call:5  Minutes  Call start: 510  pm  Call end:   515 pm

## 2021-06-13 NOTE — TELEPHONE ENCOUNTER
RN cannot approve Refill Request    RN can NOT refill this medication PCP messaged that patient is overdue for Labs. Last office visit: Visit date not found Last Physical: 7/25/2018 Last MTM visit: Visit date not found Last visit same specialty: 10/16/2019 Libby Silva MD.  Next visit within 3 mo: Visit date not found  Next physical within 3 mo: Visit date not found      Cally Reynaga, [facility] Triage/Med Refill 12/5/2020    Requested Prescriptions   Pending Prescriptions Disp Refills     hydroCHLOROthiazide (HYDRODIURIL) 12.5 MG tablet [Pharmacy Med Name: HYDROCHLOROTHIAZIDE 12.5MG TABS] 90 tablet 1     Sig: TAKE ONE TABLET BY MOUTH ONCE DAILY       Diuretics/Combination Diuretics Refill Protocol  Failed - 12/4/2020  5:01 AM        Failed - Visit with PCP or prescribing provider visit in past 12 months     Last office visit with prescriber/PCP: Visit date not found OR same dept: Visit date not found OR same specialty: 10/16/2019 Libby Silva MD  Last physical: 7/25/2018 Last MTM visit: Visit date not found   Next visit within 3 mo: Visit date not found  Next physical within 3 mo: Visit date not found  Prescriber OR PCP: Kathryn Samuels MD  Last diagnosis associated with med order: 1. Hand swelling  - hydroCHLOROthiazide (HYDRODIURIL) 12.5 MG tablet [Pharmacy Med Name: HYDROCHLOROTHIAZIDE 12.5MG TABS]; TAKE ONE TABLET BY MOUTH ONCE DAILY  Dispense: 90 tablet; Refill: 1    If protocol passes may refill for 12 months if within 3 months of last provider visit (or a total of 15 months).             Failed - Serum Potassium in past 12 months      No results found for: LN-POTASSIUM          Failed - Serum Sodium in past 12 months      No results found for: LN-SODIUM          Passed - Blood pressure on file in past 12 months     BP Readings from Last 1 Encounters:   12/11/19 130/80             Passed - Serum Creatinine in past 12 months      Creatinine   Date Value Ref Range Status   11/16/2020  0.78 0.60 - 1.10 mg/dL Final

## 2021-06-14 NOTE — TELEPHONE ENCOUNTER
I sent a 1 month refill of this patient's medication.  They will need to be seen prior to further refills being prescribed.     They should call [phone] to get scheduled at the [facility].       Thanks!    BB

## 2021-06-14 NOTE — TELEPHONE ENCOUNTER
RN cannot approve Refill Request    RN can NOT refill this medication PCP messaged that patient is overdue for Office Visit. Last office visit: Visit date not found Last Physical: 7/25/2018 Last MTM visit: Visit date not found Last visit same specialty: 10/16/2019 Libby Silva MD.  Next visit within 3 mo: Visit date not found  Next physical within 3 mo: Visit date not found      Cally Reynaga, [facility] Triage/Med Refill 1/3/2021    Requested Prescriptions   Pending Prescriptions Disp Refills     hydroCHLOROthiazide (HYDRODIURIL) 12.5 MG tablet [Pharmacy Med Name: HYDROCHLOROTHIAZIDE 12.5MG TABS] 30 tablet 0     Sig: TAKE ONE TABLET BY MOUTH ONCE DAILY       Diuretics/Combination Diuretics Refill Protocol  Failed - 1/3/2021  5:02 AM        Failed - Visit with PCP or prescribing provider visit in past 12 months     Last office visit with prescriber/PCP: Visit date not found OR same dept: Visit date not found OR same specialty: 10/16/2019 Libby Silva MD  Last physical: 7/25/2018 Last MTM visit: Visit date not found   Next visit within 3 mo: Visit date not found  Next physical within 3 mo: Visit date not found  Prescriber OR PCP: Kathryn Samuels MD  Last diagnosis associated with med order: 1. Hand swelling  - hydroCHLOROthiazide (HYDRODIURIL) 12.5 MG tablet [Pharmacy Med Name: HYDROCHLOROTHIAZIDE 12.5MG TABS]; TAKE ONE TABLET BY MOUTH ONCE DAILY  Dispense: 30 tablet; Refill: 0    If protocol passes may refill for 12 months if within 3 months of last provider visit (or a total of 15 months).             Failed - Serum Potassium in past 12 months      No results found for: LN-POTASSIUM          Failed - Serum Sodium in past 12 months      No results found for: LN-SODIUM          Failed - Blood pressure on file in past 12 months     BP Readings from Last 1 Encounters:   12/11/19 130/80             Passed - Serum Creatinine in past 12 months      Creatinine   Date Value Ref Range Status    11/16/2020 0.78 0.60 - 1.10 mg/dL Final

## 2021-06-14 NOTE — TELEPHONE ENCOUNTER
CINDY to schedule a med check appointment, can call [facility] at [phone]  With Dr. Samuels

## 2021-06-14 NOTE — PROGRESS NOTES
"ASSESSMENT AND PLAN:  Brenda Gunn 47 y.o. female has Raynaud's with history of ulceration on sildenafil.  She is on amlodipine.  She has noted ankle edema only toward the end of the day when she is on her feet.  She has intermittent heartburn for which she takes over-the-counter measures.  She has no serologic evidence of autoimmunity.  She has not developed any other features suggestive of scleroderma.  Recently she was provided with refills.  Continue both Revatio and amlodipine as now.  Return for follow-up in 6 months.      Diagnoses and all orders for this visit:    Raynaud's disease without gangrene    Ischemic ulcer of finger, limited to breakdown of skin    Heartburn    HISTORY OF PRESENTING ILLNESS:  Brenda Gunn, 47 y.o., female is here for follow-up.  She has severe Raynaud's.  She is to have ulceration in the index and the middle finger and thumb.  Since she has been on sildenafil 3 times daily and these have healed nicely.  She noted mild discomfort with the Raynaud's.  2.0/10.  She is able to do all her day-to-day activities nicely.  She noted morning stiffness is only 5 minutes.  There is no fever or weight loss blurry vision eye redness mouth also she has had nausea there is no rash.  She does not have photosensitivity.  She still gets heartburn.  When she was on amlodipine twice daily, 10 mg, she had edema of the lower extremities.  She has has not had repeated miscarriages, DVT PE, seizures or kidney issues.  She noted no shortness of breath.  She has not had heartburn.  She has not noted a \"red spots such as in her buccal mucosa and lips or tongue.  She is not a smoker.  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  During the summer months this issue was not that significant.  This is interfering now with many of her day-to-day activities.  Mom is said to have arthritis, osteoporosis and a grandmother rheumatoid arthritis.Further historical information and ADL limitations as noted in the " "multidimensional health assessment questionnaire attached in the EMR.    ALLERGIES:Ortho tri-cyclen (21)    PAST MEDICAL/ACTIVE PROBLEMS/MEDICATION/ FAMILY HISTORY/SOCIAL DATA:  The patient has a family history of  No past medical history on file.  History   Smoking Status     Never Smoker   Smokeless Tobacco     Not on file     Patient Active Problem List   Diagnosis     Raynaud's disease without gangrene     Ischemic ulcer of finger, limited to breakdown of skin     Bilateral lower extremity edema     Heartburn     Current Outpatient Prescriptions   Medication Sig Dispense Refill     amLODIPine (NORVASC) 10 MG tablet TAKE 0.5 TABLETS (5 MG TOTAL) BY MOUTH 2 (TWO) TIMES A DAY. 30 tablet 2     calcium carbonate-vitamin D3 500 mg(1,250mg) -125 unit per tablet Take by mouth.       FA/MV,CA,IRON,MIN/LYCOPENE/LUT (MULTIVITAL ORAL) Take by mouth.       mupirocin (BACTROBAN) 2 % ointment APPLY BY TOPICAL ROUTE 2 TIMES EVERY DAY A SMALL AMOUNT TO THE AFFECTED AREA  1     sildenafil (REVATIO) 20 mg tablet TAKE 1 TABLET (20 MG TOTAL) BY MOUTH 3 (THREE) TIMES A DAY. 270 tablet 1     ibuprofen (ADVIL,MOTRIN) 200 MG tablet Take by mouth.       triamcinolone (KENALOG) 0.1 % ointment APPLY BY TOPICAL ROUTE 2 TIMES EVERY DAY A THIN LAYER TO THE AFFECTED AREA(S)  1     No current facility-administered medications for this visit.      DETAILED EXAMINATION  11/22/17  :  Vitals:    11/22/17 0931   BP: 126/76   Weight: 169 lb 4.8 oz (76.8 kg)   Height: 5' 5\" (1.651 m)     Alert oriented. Head including the face is examined for malar rash, heliotropes, scarring, lupus pernio. Eyes examined for redness such as in episcleritis/scleritis, periorbital lesions.   Neck/ Face examined for parotid gland swelling, range of motion of neck.  Left upper and lower and right upper and lower extremities examined for tenderness, swelling, warmth of the appendicular joints, range of motion, edema, rash.  Some of the important findings included: She has " bluish discoloration of some of the digits such as the right index, there is no sclerodactyly, there is no ulceration.  She does not have synovitis in any of the palpable appendicular joints.  She has scars from the prior ulcerations, such as on the right index..    LAB / IMAGING DATA:  ALT   Date Value Ref Range Status   05/09/2017 16 0 - 45 U/L Final     Albumin   Date Value Ref Range Status   05/09/2017 4.0 3.5 - 5.0 g/dL Final     Creatinine   Date Value Ref Range Status   05/09/2017 0.76 0.60 - 1.10 mg/dL Final   04/04/2016 0.77 0.60 - 1.10 mg/dL Final       WBC   Date Value Ref Range Status   05/09/2017 7.4 4.0 - 11.0 thou/uL Final     Hemoglobin   Date Value Ref Range Status   05/09/2017 14.1 12.0 - 16.0 g/dL Final   04/04/2016 13.6 12.0 - 16.0 g/dL Final     Platelets   Date Value Ref Range Status   05/09/2017 254 140 - 440 thou/uL Final       Lab Results   Component Value Date    SEDRATE 4 09/20/2016

## 2021-06-15 PROBLEM — R12 HEARTBURN: Status: ACTIVE | Noted: 2017-05-09

## 2021-06-16 NOTE — TELEPHONE ENCOUNTER
Telephone Encounter by Maye Jones at 4/19/2019 11:28 AM     Author: Maye Jones Service: -- Author Type: --    Filed: 4/19/2019 11:33 AM Encounter Date: 4/15/2019 Status: Signed    : Maye Jones APPROVED:    Approval start date: 4/17/19  Approval end date: 4/17/20    Pharmacy has been notified of approval and will contact patient when medication is ready for pickup.

## 2021-06-17 NOTE — TELEPHONE ENCOUNTER
Telephone Encounter by Winsome Vuong at 9/29/2020  2:40 PM     Author: Winsome Vuong Service: -- Author Type: --    Filed: 9/29/2020  2:41 PM Encounter Date: 9/28/2020 Status: Signed    : Winsome Vuong APPROVED:    Approval start date: 9/28/2020  Approval end date:  9/28/2021    Need to be filled [facility]

## 2021-06-17 NOTE — TELEPHONE ENCOUNTER
----- Message from Sendy Diaz RN sent at 5/10/2021  7:50 AM CDT -----  Please call pt to schedule lab only appt and follow up with Dr Katz.

## 2021-06-17 NOTE — TELEPHONE ENCOUNTER
Telephone Encounter by Winsome Vuong at 9/28/2020  3:55 PM     Author: Winsome Vuong Service: -- Author Type: --    Filed: 9/28/2020  3:55 PM Encounter Date: 9/28/2020 Status: Signed    : Winsome Vuong

## 2021-06-18 NOTE — LETTER
Letter by Libby Silva MD at      Author: Libby Silva MD Service: -- Author Type: --    Filed:  Encounter Date: 3/6/2019 Status: (Other)       March 6, 2019     Patient: Brenda Gunn   YOB: 1970   Date of Visit: 3/6/2019       To Whom it May Concern:    Brenda Gunn was seen in my clinic on 3/6/2019.        Sincerely,         Electronically signed by Libby Silva MD

## 2021-06-19 NOTE — PROGRESS NOTES
"    Assessment/Plan:     Health maintenance female exam.  All questions answered.  Await pap smear results.  Breast self exam technique reviewed and patient encouraged to perform self-exam monthly.  Discussed healthy lifestyle modifications.  Mammogram ordered.  Await fasting lab results  The following high BMI interventions were performed this visit: encouragement to exercise and weight monitoring    1. Healthcare maintenance  - Lipid Cascade  - Glycosylated Hemoglobin A1c  - Basic Metabolic Panel  - Thyroid [place]  - Gynecologic Cytology (PAP Smear)  - HPV High Risk DNA Cervical    2. Visit for screening mammogram  - Mammo Screening Bilateral; Future    3. Hand swelling  This seems to be at least partially dependent swelling.  Certainly some of this could be due to her enough as well.  Hydrochlorothiazide will be sent to the pharmacy to see if this will be beneficial.  We discussed that if she would like to take it daily she \"or she could just take it on an as-needed basis.  - hydroCHLOROthiazide (HYDRODIURIL) 12.5 MG tablet; Take 1 tablet (12.5 mg total) by mouth daily.  Dispense: 90 tablet; Refill: 1    4. Athlete's foot  Fluconazole sent to the pharmacy.  She will also continue using the Lotrimin cream  - fluconazole (DIFLUCAN) 150 MG tablet; Take 1 tablet (150 mg total) by mouth every other day for 10 days.  Dispense: 5 tablet; Refill: 0    5. Menorrhagia  We discussed several options today and the patient decided to defer any treatment at this point.  I think she would be a good candidate for a intrauterine device if she would like.      Subjective:      Brenda Gunn is a 48 y.o. female who presents for an annual exam.  She is overall doing well.  She has a history of Raynaud's phenomenon and follows with rheumatology.  She is currently taking sildenafil which seems to be helpful.    She is due for basic laboratory testing today    She also wants to discuss swelling her hands bilaterally today.  She notes " that this can be dependent and is often worse at the end of the day or after long walk.  She notes though that sometimes it is there throughout the day as well.  It can be somewhat painful in the sense that it feels tight.    The patient notes that she has itchy peeling skin on the left inner aspect of her foot.  She would like this looked at today as well.  She has used Lotrimin on it once or twice but is unsure if it was helpful.    Menorrhagia: Patient notes that the first few days of her menstrual cycle are very heavy.  She is getting her period every 3 weeks as well.  She does not feel like she wants to be on any medication for this.  She is wondering if there is anything that she can do about it.    Healthy Habits:   Regular Exercise: Yes  Sunscreen Use: Yes  Healthy Diet: Yes  Dental Visits Regularly: Yes  Seat Belt: Yes  Sexually active: Yes  Self Breast Exam Monthly:Yes  Colonoscopy: N/A  Prevention of Osteoporosis: Yes  Last Dexa: N/A    Immunization History   Administered Date(s) Administered     DTaP, historic 1981     IPV 1981     Influenza, inj, historic,unspecified 2003, 10/05/2012, 2013     Mumps 1979     Rubella 1981     Td,adult,historic,unspecified 10/26/1999     Tdap 2009     Immunization status: up to date and documented.    Gynecologic History  Patient's last menstrual period was 2018 (exact date).  Contraception: vasectomy  Last Pap: . Results were: normal  Last mammogram: . Results were: normal      OB History    Para Term  AB Living   2 2 2      SAB TAB Ectopic Multiple Live Births             # Outcome Date GA Lbr [place]/2nd Weight Sex Delivery Anes PTL Lv   2 Term            1 Term                   Current Outpatient Prescriptions   Medication Sig Dispense Refill     amLODIPine (NORVASC) 10 MG tablet TAKE 0.5 TABLETS (5 MG TOTAL) BY MOUTH 2 (TWO) TIMES A DAY. 30 tablet 1     FA/MV,CA,IRON,MIN/LYCOPENE/LUT (MULTIVITAL ORAL)  Take by mouth.       ibuprofen (ADVIL,MOTRIN) 200 MG tablet Take by mouth.       latanoprost (XALATAN) 0.005 % ophthalmic solution        mupirocin (BACTROBAN) 2 % ointment APPLY BY TOPICAL ROUTE 2 TIMES EVERY DAY A SMALL AMOUNT TO THE AFFECTED AREA  1     sildenafil, antihypertensive, (REVATIO) 20 mg tablet TAKE 1 TABLET (20 MG TOTAL) BY MOUTH 3 (THREE) TIMES A DAY. 270 tablet 1     triamcinolone (KENALOG) 0.1 % ointment APPLY BY TOPICAL ROUTE 2 TIMES EVERY DAY A THIN LAYER TO THE AFFECTED AREA(S)  1     fluconazole (DIFLUCAN) 150 MG tablet Take 1 tablet (150 mg total) by mouth every other day for 10 days. 5 tablet 0     hydroCHLOROthiazide (HYDRODIURIL) 12.5 MG tablet Take 1 tablet (12.5 mg total) by mouth daily. 90 tablet 1     No current facility-administered medications for this visit.      No past medical history on file.  No past surgical history on file.  Ortho tri-cyclen (21)  Family History   Problem Relation Age of Onset     Cancer Mother      thyroid     Cancer Father      prostate     Cancer Maternal Aunt      thyroid cancer     Colon cancer Paternal Grandmother      Breast cancer Paternal Aunt 38     Social History     Social History     Marital status:      Spouse name: N/A     Number of children: N/A     Years of education: N/A     Occupational History     Not on file.     Social History Main Topics     Smoking status: Never Smoker     Smokeless tobacco: Never Used     Alcohol use Not on file     Drug use: Not on file     Sexual activity: Not on file     Other Topics Concern     Not on file     Social History Narrative       Review of Systems  General:  Denies problems  Eyes:  Denies problems  Ears/Nose/Throat:  Denies problems  Cardiovascular:  Denies problems  Respiratory:  Denies problems  Gastrointestinal:  Denies problems  Genitourinary:  Denies problems  Musculoskeletal:  Denies problems  Skin:  Denies problems, Neurologic:  Denies problems  Psychiatric:  Denies problems  Endocrine:   "Denies problems  Heme/Lymphatic:  Denies problems  Allergic/Immunologic:  Denies problems       Objective:         Vitals:    07/25/18 1420   BP: 134/80   Pulse: 68   Resp: 12   Temp: 98.2  F (36.8  C)   TempSrc: Oral   Weight: 165 lb (74.8 kg)   Height: 5' 4.5\" (1.638 m)       Physical Exam:  General Appearance: Alert, cooperative, no distress, appears stated age   Head: Normocephalic, without obvious abnormality, atraumatic  Eyes: PERRL, conjunctiva/corneas clear, EOM's intact   Ears: Normal TM's and external ear canals, both ears  Nose:Nares normal, septum midline,mucosa normal, no drainage    Throat:Lips, mucosa, and tongue normal; teeth and gums normal  Neck: Supple, symmetrical, trachea midline, no adenopathy;  thyroid: not enlarged, symmetric, no tenderness/mass/nodules  Back: Symmetric, no curvature, ROM normal,  Lungs: Clear to auscultation bilaterally, respirations unlabored  Breasts: No breast masses, tenderness, asymmetry, or nipple discharge.  Heart: Regular rate and rhythm, S1 and S2 normal, no murmur, rub, or gallop  Abdomen: Soft, non-tender, bowel sounds active all four quadrants,  no masses, no organomegaly  Pelvic:normal external female genitalia, normal appearing vaginal mucosa and cervix  Extremities: Extremities normal, atraumatic, no cyanosis or edema, very slight swelling in bilateral hands.  Very slight swelling in bilateral ankles.  Skin: Skin color, texture, turgor normal, no rashes or lesions, athlete's foot on the left lateral and medial foot.  Lymph nodes: Cervical, supraclavicular, and axillary nodes normal and   Neurologic: Normal       "

## 2021-06-20 NOTE — LETTER
Letter by Aura Katz MBBS at      Author: Aura Katz MBBS Service: -- Author Type: --    Filed:  Encounter Date: 3/25/2020 Status: (Other)                         March 25, 2020    Patient: Brenda Gunn   MR Number: [id number]   YOB: 1970   Date of Visit: 3/25/2020      Brenda has Raynaud's phenomenon. She should have adequate space away from others and should not  have direct contact with children.     Any questions, feel free to contact my office at [phone].     Sincerely,     Aura Katz MD   Rheumatology

## 2021-06-20 NOTE — PROGRESS NOTES
"ASSESSMENT AND PLAN:  Brenda Gunn 48 y.o. female is here for follow-up.  She has severe enlargement ulceration of the digits now healed on sildenafil, she is on amlodipine, she has mild ankle edema worse on the left side long-standing Adams, she has not had new changes.  As she has well-controlled heartburn.  She has not noted tightening of the skin, rash, photosensitivity, mild ptosis.  She has not had new joint symptoms.  Serologically she is negative for SIMONE, SCL 70, anticentromere.  Overall no features to suggest scleroderma.  Continue sildenafil, Norvasc as now.  Return for follow-up in 6 months or sooner.    Diagnoses and all orders for this visit:    Raynaud's disease without gangrene  -     HM1(CBC and Differential)  -     HM1 (CBC with Diff)    Ischemic ulcer of finger, limited to breakdown of skin (H)  -     HM1(CBC and Differential)  -     HM1 (CBC with Diff)    Heartburn  -     HM1(CBC and Differential)  -     HM1 (CBC with Diff)    HISTORY OF PRESENTING ILLNESS:  Brenda Gunn, 48 y.o., female is here for follow-up.  She has severe Raynaud's.  She used to have ulceration in the index and the middle finger and thumb.  Since she has been on sildenafil 3 times daily, sometimes twice daily, these have healed nicely.  She noted mild discomfort with the Raynaud's.  2.0/10.  She is able to do all her day-to-day activities nicely.  She noted morning stiffness is only 5 minutes.  There is no fever or weight loss blurry vision eye redness mouth also she has had nausea there is no rash.  She does not have photosensitivity.  She still gets heartburn.  When she was on amlodipine twice daily, 10 mg, she had edema of the lower extremities.  She has has not had repeated miscarriages, DVT PE, seizures or kidney issues.  She noted no shortness of breath.  She has not had heartburn.  She has not noted a \"red spots such as in her buccal mucosa and lips or tongue.  She is not a smoker.  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  During the summer months this issue was not that significant.  This is interfering now with many of her day-to-day activities.  Mom is said to have arthritis, osteoporosis and a grandmother rheumatoid arthritis.Further historical information and ADL limitations as noted in the multidimensional health assessment questionnaire attached in the EMR.    ALLERGIES:Ortho tri-cyclen (21)    PAST MEDICAL/ACTIVE PROBLEMS/MEDICATION/ FAMILY HISTORY/SOCIAL DATA:  The patient has a family history of  No past medical history on file.  History   Smoking Status     Never Smoker   Smokeless Tobacco     Never Used     Patient Active Problem List   Diagnosis     Raynaud's disease without gangrene     Ischemic ulcer of finger, limited to breakdown of skin (H)     Bilateral lower extremity edema     Heartburn     Current Outpatient Prescriptions   Medication Sig Dispense Refill     amLODIPine (NORVASC) 10 MG tablet TAKE 0.5 TABLETS (5 MG TOTAL) BY MOUTH 2 (TWO) TIMES A DAY. 30 tablet 1     FA/MV,CA,IRON,MIN/LYCOPENE/LUT (MULTIVITAL ORAL) Take by mouth.       hydroCHLOROthiazide (HYDRODIURIL) 12.5 MG tablet Take 1 tablet (12.5 mg total) by mouth daily. 90 tablet 1     ibuprofen (ADVIL,MOTRIN) 200 MG tablet Take by mouth.       latanoprost (XALATAN) 0.005 % ophthalmic solution        mupirocin (BACTROBAN) 2 % ointment APPLY BY TOPICAL ROUTE 2 TIMES EVERY DAY A SMALL AMOUNT TO THE AFFECTED AREA  1     sildenafil, antihypertensive, (REVATIO) 20 mg tablet TAKE 1 TABLET (20MG) BY MOUTH THREE TIMES DAILY. GENERIC FOR REVATIO. 270 tablet 0     triamcinolone (KENALOG) 0.1 % ointment APPLY BY TOPICAL ROUTE 2 TIMES EVERY DAY A THIN LAYER TO THE AFFECTED AREA(S)  1     No current facility-administered medications for this visit.      DETAILED EXAMINATION  08/27/18  :  Vitals:    08/27/18 1634   BP: 116/74   Pulse: 72   Weight: 165 lb (74.8 kg)     Alert oriented. Head including the face is examined for malar rash, heliotropes,  scarring, lupus pernio. Eyes examined for redness such as in episcleritis/scleritis, periorbital lesions.   Neck/ Face examined for parotid gland swelling, range of motion of neck.  Left upper and lower and right upper and lower extremities examined for tenderness, swelling, warmth of the appendicular joints, range of motion, edema, rash.  Some of the important findings included: She has bluish discoloration of some of the digits such as the right index, there is no sclerodactyly, there is no ulceration of the fingertips, toe tips.  There is no bluish discoloration or other discoloration of the toes..  She does not have synovitis in any of the palpable appendicular joints of the upper extremities.  No Joint line tenderness of the knees..  She has scars from the prior ulcerations, such as on the right index..    LAB / IMAGING DATA:  ALT   Date Value Ref Range Status   05/09/2017 16 0 - 45 U/L Final     Albumin   Date Value Ref Range Status   05/09/2017 4.0 3.5 - 5.0 g/dL Final     Creatinine   Date Value Ref Range Status   07/25/2018 0.78 0.60 - 1.10 mg/dL Final   05/09/2017 0.76 0.60 - 1.10 mg/dL Final   04/04/2016 0.77 0.60 - 1.10 mg/dL Final       WBC   Date Value Ref Range Status   05/09/2017 7.4 4.0 - 11.0 thou/uL Final     Hemoglobin   Date Value Ref Range Status   05/09/2017 14.1 12.0 - 16.0 g/dL Final   04/04/2016 13.6 12.0 - 16.0 g/dL Final     Platelets   Date Value Ref Range Status   05/09/2017 254 140 - 440 thou/uL Final       Lab Results   Component Value Date    SEDRATE 4 09/20/2016

## 2021-06-23 NOTE — TELEPHONE ENCOUNTER
Refill Approved    Rx renewed per Medication Renewal Policy. Medication was last renewed on 7/25/18.    Molly Nieves, [facility] Triage/Med Refill 1/11/2019     Requested Prescriptions   Pending Prescriptions Disp Refills     hydroCHLOROthiazide (HYDRODIURIL) 12.5 MG tablet [Pharmacy Med Name: HYDROCHLOROTHIAZIDE 12.5 MG TB] 90 tablet 1     Sig: TAKE 1 TABLET BY MOUTH EVERY DAY    Diuretics/Combination Diuretics Refill Protocol  Passed - 1/11/2019  7:50 AM       Passed - Visit with PCP or prescribing provider visit in past 12 months    Last office visit with prescriber/PCP: 5/16/2016 Kathryn Samuels MD OR same dept: Visit date not found OR same specialty: 10/27/2016 Naye Mckinley MD  Last physical: 7/25/2018 Last MTM visit: Visit date not found   Next visit within 3 mo: Visit date not found  Next physical within 3 mo: Visit date not found  Prescriber OR PCP: Kathryn Samuels MD  Last diagnosis associated with med order: 1. Hand swelling  - hydroCHLOROthiazide (HYDRODIURIL) 12.5 MG tablet [Pharmacy Med Name: HYDROCHLOROTHIAZIDE 12.5 MG TB]; TAKE 1 TABLET BY MOUTH EVERY DAY  Dispense: 90 tablet; Refill: 1    If protocol passes may refill for 12 months if within 3 months of last provider visit (or a total of 15 months).            Passed - Serum Potassium in past 12 months     Lab Results   Component Value Date    Potassium 4.0 07/25/2018            Passed - Serum Sodium in past 12 months     Lab Results   Component Value Date    Sodium 137 07/25/2018            Passed - Blood pressure on file in past 12 months    BP Readings from Last 1 Encounters:   08/27/18 116/74            Passed - Serum Creatinine in past 12 months     Creatinine   Date Value Ref Range Status   07/25/2018 0.78 0.60 - 1.10 mg/dL Final

## 2021-06-24 NOTE — PROGRESS NOTES
Chief Complaint   Patient presents with     Mass     on forehead         HPI:   Brenda Gunn is a 48 y.o. female has noted a lump on her forehead since several months ago.  It has not enlarged since she first noted it.  It has never been red or tender.  It has never drained.  She requests a refill of triamcinolone cream.  She has raynauds and gets cracks on her fingers.    She also knows she is about due for her tetanus shot.    ROS:  A 10 point comprehensive review of systems was negative except as noted.     Medications:  Current Outpatient Medications on File Prior to Visit   Medication Sig Dispense Refill     amLODIPine (NORVASC) 10 MG tablet Take 0.5 tablets (5 mg total) by mouth 2 (two) times a day. 90 tablet 0     FA/MV,CA,IRON,MIN/LYCOPENE/LUT (MULTIVITAL ORAL) Take by mouth.       hydroCHLOROthiazide (HYDRODIURIL) 12.5 MG tablet TAKE 1 TABLET BY MOUTH EVERY DAY 90 tablet 1     ibuprofen (ADVIL,MOTRIN) 200 MG tablet Take by mouth.       latanoprost (XALATAN) 0.005 % ophthalmic solution        mupirocin (BACTROBAN) 2 % ointment APPLY BY TOPICAL ROUTE 2 TIMES EVERY DAY A SMALL AMOUNT TO THE AFFECTED AREA  1     sildenafil, antihypertensive, (REVATIO) 20 mg tablet TAKE 1 TABLET (20MG) BY MOUTH THREE TIMES DAILY. GENERIC FOR REVATIO. 270 tablet 0     [DISCONTINUED] triamcinolone (KENALOG) 0.1 % ointment APPLY BY TOPICAL ROUTE 2 TIMES EVERY DAY A THIN LAYER TO THE AFFECTED AREA(S)  1     No current facility-administered medications on file prior to visit.          Social History:  Social History     Tobacco Use     Smoking status: Never Smoker     Smokeless tobacco: Never Used   Substance Use Topics     Alcohol use: Not on file         Physical Exam:   Vitals:    03/06/19 1305   BP: 132/82   Pulse: 68   Resp: 12   Temp: 99  F (37.2  C)   TempSrc: Oral   Weight: 163 lb (73.9 kg)       GEN:  NAD  FACE:  1 cm subq nodule right forehead.  No erythema.  No fluctuance.  Nontender  HANDS:  No skin  lesion        Assessment/Plan:    1. Skin nodule  Sebaceous cyst vs lipoma.  Discussed not serious.  If she wants it removed, recommended dermatology.  Sebaceous cysts can sometimes get infected.  Discussed signs and symptoms  Recheck if enlarging or problems    2. Ischemic ulcer of finger, limited to breakdown of skin (H)  Refilled cream  - triamcinolone (KENALOG) 0.1 % ointment; APPLY BY TOPICAL ROUTE 2 TIMES EVERY DAY A THIN LAYER TO THE AFFECTED AREA(S)  Dispense: 30 g; Refill: 1    3. Immunization due  Not due until 4/28/2019.  She will return for nurse only visit.  - Td, Adult, PF; Future           Libby Silva MD      3/6/2019    The following portions of the patient's history were reviewed and updated as appropriate: allergies, current medications, past family history, past medical history, past social history, past surgical history and problem list.

## 2021-06-25 NOTE — TELEPHONE ENCOUNTER
Prior Authorization request from [place], [place]    Refill sent in on 5/10/21 for Sildenafil 20mg and prior authorization  on 21. Requesting prior auth be renewed.

## 2021-06-25 NOTE — TELEPHONE ENCOUNTER
----- Message from Sendy Diaz RN sent at 5/20/2021  2:09 PM CDT -----  Please call pt to schedule follow up with Dr Katz.

## 2021-07-04 NOTE — TELEPHONE ENCOUNTER
Telephone Encounter by Winsome Vuong at 6/8/2021  4:35 PM     Author: Winsome Vuong Service: -- Author Type: --    Filed: 6/8/2021  4:35 PM Encounter Date: 6/7/2021 Status: Signed    : Winsome Vuong APPROVED:    Approval start date: 6/3/2021  Approval end date:  6/3/2022    Pharmacy has been notified of approval and will contact patient when medication is ready for pickup.

## 2021-07-04 NOTE — TELEPHONE ENCOUNTER
Telephone Encounter by Winsome Vuong at 6/8/2021  9:39 AM     Author: Winsome Vuong Service: -- Author Type: --    Filed: 6/8/2021  9:39 AM Encounter Date: 6/7/2021 Status: Signed    : Winsome Vuong       [facility] PA team  [phone]  Pool: [facility] ([id number])          PA has been initiated.             Response will be received via fax and may take up to 5-10 business days depending on plan

## 2021-07-11 ENCOUNTER — HEALTH MAINTENANCE LETTER (OUTPATIENT)
Age: 51
End: 2021-07-11

## 2021-08-04 ENCOUNTER — APPOINTMENT (OUTPATIENT)
Dept: LAB | Facility: CLINIC | Age: 51
End: 2021-08-04
Payer: COMMERCIAL

## 2021-08-04 DIAGNOSIS — I73.00 RAYNAUD'S DISEASE WITHOUT GANGRENE: ICD-10-CM

## 2021-08-04 LAB
ALBUMIN SERPL-MCNC: 4 G/DL (ref 3.5–5)
ALT SERPL W P-5'-P-CCNC: 25 U/L (ref 0–45)
CREAT SERPL-MCNC: 0.81 MG/DL (ref 0.6–1.1)
ERYTHROCYTE [DISTWIDTH] IN BLOOD BY AUTOMATED COUNT: 12.9 % (ref 10–15)
GFR SERPL CREATININE-BSD FRML MDRD: 84 ML/MIN/1.73M2
HCT VFR BLD AUTO: 37.5 % (ref 35–47)
HGB BLD-MCNC: 12.9 G/DL (ref 11.7–15.7)
MCH RBC QN AUTO: 32.7 PG (ref 26.5–33)
MCHC RBC AUTO-ENTMCNC: 34.4 G/DL (ref 31.5–36.5)
MCV RBC AUTO: 95 FL (ref 78–100)
PLATELET # BLD AUTO: 245 10E3/UL (ref 150–450)
RBC # BLD AUTO: 3.94 10E6/UL (ref 3.8–5.2)
WBC # BLD AUTO: 5.6 10E3/UL (ref 4–11)

## 2021-08-04 PROCEDURE — 36415 COLL VENOUS BLD VENIPUNCTURE: CPT

## 2021-08-04 PROCEDURE — 85027 COMPLETE CBC AUTOMATED: CPT

## 2021-08-04 PROCEDURE — 82040 ASSAY OF SERUM ALBUMIN: CPT

## 2021-08-04 PROCEDURE — 82565 ASSAY OF CREATININE: CPT

## 2021-08-04 PROCEDURE — 84460 ALANINE AMINO (ALT) (SGPT): CPT

## 2021-08-09 ENCOUNTER — VIRTUAL VISIT (OUTPATIENT)
Dept: RHEUMATOLOGY | Facility: CLINIC | Age: 51
End: 2021-08-09
Payer: COMMERCIAL

## 2021-08-09 DIAGNOSIS — R12 HEARTBURN: ICD-10-CM

## 2021-08-09 DIAGNOSIS — L98.491 ISCHEMIC ULCER OF FINGER, LIMITED TO BREAKDOWN OF SKIN (H): ICD-10-CM

## 2021-08-09 DIAGNOSIS — I73.00 RAYNAUD'S DISEASE WITHOUT GANGRENE: Primary | ICD-10-CM

## 2021-08-09 PROCEDURE — 99214 OFFICE O/P EST MOD 30 MIN: CPT | Mod: 95 | Performed by: INTERNAL MEDICINE

## 2021-08-09 RX ORDER — SILDENAFIL CITRATE 20 MG/1
TABLET ORAL
COMMUNITY
Start: 2021-05-10 | End: 2021-08-09

## 2021-08-09 RX ORDER — LATANOPROST 50 UG/ML
SOLUTION/ DROPS OPHTHALMIC
COMMUNITY
Start: 2021-07-23

## 2021-08-09 RX ORDER — AMLODIPINE BESYLATE 5 MG/1
5 TABLET ORAL 2 TIMES DAILY
Qty: 180 TABLET | Refills: 1 | Status: SHIPPED | OUTPATIENT
Start: 2021-08-09 | End: 2022-01-25

## 2021-08-09 RX ORDER — MULTIPLE VITAMINS W/ MINERALS TAB 9MG-400MCG
1 TAB ORAL DAILY
COMMUNITY

## 2021-08-09 RX ORDER — SILDENAFIL CITRATE 20 MG/1
TABLET ORAL
Qty: 270 TABLET | Refills: 1 | Status: SHIPPED | OUTPATIENT
Start: 2021-08-09 | End: 2022-02-04

## 2021-08-09 RX ORDER — AMLODIPINE BESYLATE 10 MG/1
TABLET ORAL
COMMUNITY
Start: 2021-05-20 | End: 2021-08-09

## 2021-08-09 RX ORDER — IBUPROFEN 200 MG
TABLET ORAL
COMMUNITY

## 2021-08-09 NOTE — PROGRESS NOTES
Brenda is a 51 year old who is being evaluated via a billable video visit.      How would you like to obtain your AVS? MyChart  If the video visit is dropped, the invitation should be resent by: Text to cell phone: [phone]  Will anyone else be joining your video visit? No      Video Start Time: 2:32 PM  Video-Visit Details    Type of service:  Video Visit    Video End Time:2:340 PM    Originating Location (pt. Location): Home    Distant Location (provider location):  [facility]     Platform used for Video Visit: [employer]      This document was created using a software with less than 100% fidelity, at times resulting in unintended, even erroneous syntax and grammar.  The reader is advised to keep this under consideration while reviewing, interpreting this note.           ASSESSMENT AND PLAN:    Diagnoses and all orders for this visit:  Raynaud's disease without gangrene  -     sildenafil (REVATIO) 20 MG tablet; TAKE ONE TABLET BY MOUTH THREE TIMES A DAY  -     amLODIPine (NORVASC) 5 MG tablet; Take 1 tablet (5 mg) by mouth 2 times daily  -     omeprazole (PRILOSEC) 20 MG DR capsule; Take 1 capsule (20 mg) by mouth daily  Ischemic ulcer of finger, limited to breakdown of skin (H)  Heartburn  -     omeprazole (PRILOSEC) 20 MG DR capsule; Take 1 capsule (20 mg) by mouth daily      Follow up in 6 months      HISTORY OF PRESENTING ILLNESS:  Brenda ZAMBRANO Esboldt 51 year old is evaluated here via video/audio link.  This patient is here for follow-up of Raynaud's which can be quite severe for her at times leading to ulceration of fingertips with open sores, also heartburn reflux symptoms. She is on sildenafil, amlodipine.  She does get heartburn for which she takes over-the-counter measures.  She has not noticed thickening of the skin.  There are occasional mouth ulcers.  No joint pains.  Her autoantibody profile was negative for SIMONE, SCL 70, and negative anticentromere.  She noted no shortness of  breath.  No edema of the ankles.  In the warm weather her symptoms have improved.       ROS enquiry held for fever, ocular symptoms, rash, headache,  GI issues.  Today we also discussed the issues related to the current pandemic, the pros and cons of the current treatment plan, the CDC guidelines such as social distancing washing the hands covering the cough.  ALLERGIES:Ortho tri-cyclen (21) [norgestim-eth estrad triphasic]    PAST MEDICAL/ACTIVE PROBLEMS/MEDICATION/SOCIAL DATA  No past medical history on file.  History   Smoking Status     Never Smoker   Smokeless Tobacco     Never Used     Patient Active Problem List   Diagnosis     Raynaud's disease without gangrene     Ischemic ulcer of finger, limited to breakdown of skin (H)     Bilateral lower extremity edema     Heartburn     Current Outpatient Medications   Medication Sig Dispense Refill     amLODIPine (NORVASC) 10 MG tablet TAKE ONE-HALF TABLET BY MOUTH TWICE A DAY       ibuprofen (ADVIL/MOTRIN) 200 MG tablet        latanoprost (XALATAN) 0.005 % ophthalmic solution        multivitamin w/minerals (THERA-VIT-M) tablet Take 1 tablet by mouth daily       sildenafil (REVATIO) 20 MG tablet TAKE ONE TABLET BY MOUTH THREE TIMES A DAY       hydrochlorothiazide (HYDRODIURIL) 12.5 MG tablet Take 1 tablet (12.5 mg) by mouth daily Due for an office visit prior to further refills (noted 02/01/21) (Patient not taking: Reported on 8/9/2021) 10 tablet 0         EXAMINATION:    Using the audio and video link as best as possible the constitutional, neck, neurologic, psych, skin, both upper extremities areas/organ system were evaluated during this assessment.  Some of the important findings: Alert, oriented, speech fluent.  She has scars from prior ulcerations the fingertips, such as the left thumb.  Able to fully flex the digits, into fists bilaterally, wrist and elbow range of motion appear normal, abduction of the shoulder is normal.      LAB / IMAGING DATA:  ALT   Date  Value Ref Range Status   08/04/2021 25 0 - 45 U/L Final   11/16/2020 28 0 - 45 U/L Final   10/16/2019 37 0 - 45 U/L Final     Albumin   Date Value Ref Range Status   08/04/2021 4.0 3.5 - 5.0 g/dL Final   11/16/2020 4.2 3.5 - 5.0 g/dL Final   10/16/2019 4.0 3.5 - 5.0 g/dL Final       WBC   Date Value Ref Range Status   11/16/2020 6.0 4.0 - 11.0 thou/uL Final   10/16/2019 6.4 4.0 - 11.0 thou/uL Final     WBC Count   Date Value Ref Range Status   08/04/2021 5.6 4.0 - 11.0 10e3/uL Final     Hemoglobin   Date Value Ref Range Status   08/04/2021 12.9 11.7 - 15.7 g/dL Final   11/16/2020 13.7 12.0 - 16.0 g/dL Final   10/16/2019 13.6 12.0 - 16.0 g/dL Final     Platelet Count   Date Value Ref Range Status   08/04/2021 245 150 - 450 10e3/uL Final   11/16/2020 241 140 - 440 thou/uL Final   10/16/2019 242 140 - 440 thou/uL Final       No results found for: SIMONE

## 2021-09-05 ENCOUNTER — HEALTH MAINTENANCE LETTER (OUTPATIENT)
Age: 51
End: 2021-09-05

## 2021-10-31 DIAGNOSIS — I73.00 RAYNAUD'S DISEASE WITHOUT GANGRENE: ICD-10-CM

## 2021-10-31 DIAGNOSIS — R12 HEARTBURN: ICD-10-CM

## 2022-01-25 ENCOUNTER — TELEPHONE (OUTPATIENT)
Dept: RHEUMATOLOGY | Facility: CLINIC | Age: 52
End: 2022-01-25
Payer: COMMERCIAL

## 2022-01-25 DIAGNOSIS — R12 HEARTBURN: ICD-10-CM

## 2022-01-25 DIAGNOSIS — I73.00 RAYNAUD'S DISEASE WITHOUT GANGRENE: ICD-10-CM

## 2022-01-25 RX ORDER — AMLODIPINE BESYLATE 5 MG/1
TABLET ORAL
Qty: 180 TABLET | Refills: 0 | Status: SHIPPED | OUTPATIENT
Start: 2022-01-25 | End: 2022-02-21

## 2022-02-04 DIAGNOSIS — I73.00 RAYNAUD'S DISEASE WITHOUT GANGRENE: ICD-10-CM

## 2022-02-04 RX ORDER — SILDENAFIL CITRATE 20 MG/1
TABLET ORAL
Qty: 270 TABLET | Refills: 0 | Status: SHIPPED | OUTPATIENT
Start: 2022-02-04 | End: 2023-01-26

## 2022-02-21 ENCOUNTER — VIRTUAL VISIT (OUTPATIENT)
Dept: RHEUMATOLOGY | Facility: CLINIC | Age: 52
End: 2022-02-21
Payer: COMMERCIAL

## 2022-02-21 DIAGNOSIS — I73.00 RAYNAUD'S DISEASE WITHOUT GANGRENE: ICD-10-CM

## 2022-02-21 PROCEDURE — 99214 OFFICE O/P EST MOD 30 MIN: CPT | Mod: 95 | Performed by: INTERNAL MEDICINE

## 2022-02-21 RX ORDER — AMLODIPINE BESYLATE 5 MG/1
5 TABLET ORAL 2 TIMES DAILY
Qty: 180 TABLET | Refills: 0 | Status: SHIPPED | OUTPATIENT
Start: 2022-02-21 | End: 2022-10-26

## 2022-02-21 NOTE — PROGRESS NOTES
Brenda is a 51 year old who is being evaluated via a billable video visit.      How would you like to obtain your AVS? MyChart  If the video visit is dropped, the invitation should be resent by: Text to cell phone: [phone]  Will anyone else be joining your video visit? No      Video Start Time:  3:36 PM  Video-Visit Details    Type of service:  Video Visit    Video End Time:3:46 PM    Originating Location (pt. Location): Home    Distant Location (provider location):  [facility]     Platform used for Video Visit: [employer]      This document was created using a software with less than 100% fidelity, at times resulting in unintended, even erroneous syntax and grammar.  The reader is advised to keep this under consideration while reviewing, interpreting this note.           ASSESSMENT AND PLAN:    Diagnoses and all orders for this visit:  Raynaud's disease without gangrene  -     amLODIPine (NORVASC) 5 MG tablet; Take 1 tablet (5 mg) by mouth 2 times daily      Follow up in 6 months      HISTORY OF PRESENTING ILLNESS:  Brenda Millerboldt 51 year old is evaluated here via video/audio link. This patient is here for follow-up of Raynaud's which can be quite severe.  At times leading to ulceration of fingertips with open sores, also heartburn reflux symptoms. She is on sildenafil, amlodipine.  She does get heartburn for which she takes over-the-counter measures.  She has not noticed thickening of the skin.  There are occasional mouth ulcers.  No joint pains.  Her autoantibody profile was negative for SIMONE, SCL 70, and negative anticentromere.  She noted no shortness of breath.  No edema of the ankles.   During summer months her symptoms were improved, miscarriage that she takes twice daily sildenafil.  The Norvasc remains the same.   ROS enquiry held for fever, ocular symptoms, rash, headache,  GI issues.  Today we also discussed the issues related to the current pandemic, the pros and cons of the  current treatment plan, the CDC guidelines such as social distancing washing the hands covering the cough.  ALLERGIES:Ortho tri-cyclen (21) [norgestim-eth estrad triphasic]    PAST MEDICAL/ACTIVE PROBLEMS/MEDICATION/SOCIAL DATA  No past medical history on file.  History   Smoking Status     Never Smoker   Smokeless Tobacco     Never Used     Patient Active Problem List   Diagnosis     Raynaud's disease without gangrene     Ischemic ulcer of finger, limited to breakdown of skin (H)     Bilateral lower extremity edema     Heartburn     Current Outpatient Medications   Medication Sig Dispense Refill     amLODIPine (NORVASC) 5 MG tablet TAKE ONE TABLET BY MOUTH TWICE A  tablet 0     ibuprofen (ADVIL/MOTRIN) 200 MG tablet        latanoprost (XALATAN) 0.005 % ophthalmic solution        hydrochlorothiazide (HYDRODIURIL) 12.5 MG tablet Take 1 tablet (12.5 mg) by mouth daily Due for an office visit prior to further refills (noted 02/01/21) (Patient not taking: Reported on 8/9/2021) 10 tablet 0     multivitamin w/minerals (THERA-VIT-M) tablet Take 1 tablet by mouth daily       omeprazole (PRILOSEC) 20 MG DR capsule TAKE ONE CAPSULE BY MOUTH ONCE DAILY 90 capsule 0     sildenafil (REVATIO) 20 MG tablet TAKE ONE TABLET BY MOUTH THREE TIMES A  tablet 0         EXAMINATION:    Using the audio and video link as best as possible the constitutional, neck, neurologic, psych, skin, both upper extremities areas/organ system were evaluated during this assessment.  Some of the important findings: Alert, oriented, speech fluent.    Able to fully flex the digits, into fists bilaterally, wrist and elbow range of motion appear normal, abduction of the shoulder is normal.  Slight bluish discoloration of the left middle finger.  Healed ulceration on her right thumb.      LAB / IMAGING DATA:  ALT   Date Value Ref Range Status   08/04/2021 25 0 - 45 U/L Final   11/16/2020 28 0 - 45 U/L Final   10/16/2019 37 0 - 45 U/L Final      Albumin   Date Value Ref Range Status   08/04/2021 4.0 3.5 - 5.0 g/dL Final   11/16/2020 4.2 3.5 - 5.0 g/dL Final   10/16/2019 4.0 3.5 - 5.0 g/dL Final       WBC   Date Value Ref Range Status   11/16/2020 6.0 4.0 - 11.0 thou/uL Final   10/16/2019 6.4 4.0 - 11.0 thou/uL Final     WBC Count   Date Value Ref Range Status   08/04/2021 5.6 4.0 - 11.0 10e3/uL Final     Hemoglobin   Date Value Ref Range Status   08/04/2021 12.9 11.7 - 15.7 g/dL Final   11/16/2020 13.7 12.0 - 16.0 g/dL Final   10/16/2019 13.6 12.0 - 16.0 g/dL Final     Platelet Count   Date Value Ref Range Status   08/04/2021 245 150 - 450 10e3/uL Final   11/16/2020 241 140 - 440 thou/uL Final   10/16/2019 242 140 - 440 thou/uL Final       No results found for: SIMONE

## 2022-04-21 DIAGNOSIS — I73.00 RAYNAUD'S DISEASE WITHOUT GANGRENE: ICD-10-CM

## 2022-04-21 DIAGNOSIS — R12 HEARTBURN: ICD-10-CM

## 2022-07-14 DIAGNOSIS — I73.00 RAYNAUD'S DISEASE WITHOUT GANGRENE: ICD-10-CM

## 2022-07-14 DIAGNOSIS — R12 HEARTBURN: ICD-10-CM

## 2022-08-07 ENCOUNTER — HEALTH MAINTENANCE LETTER (OUTPATIENT)
Age: 52
End: 2022-08-07

## 2022-10-06 DIAGNOSIS — R12 HEARTBURN: ICD-10-CM

## 2022-10-06 DIAGNOSIS — I73.00 RAYNAUD'S DISEASE WITHOUT GANGRENE: ICD-10-CM

## 2022-10-16 DIAGNOSIS — I73.00 RAYNAUD'S DISEASE WITHOUT GANGRENE: ICD-10-CM

## 2022-10-23 ENCOUNTER — HEALTH MAINTENANCE LETTER (OUTPATIENT)
Age: 52
End: 2022-10-23

## 2022-10-26 RX ORDER — AMLODIPINE BESYLATE 5 MG/1
TABLET ORAL
Qty: 180 TABLET | Refills: 0 | Status: SHIPPED | OUTPATIENT
Start: 2022-10-26 | End: 2023-01-26

## 2022-12-13 ENCOUNTER — VIRTUAL VISIT (OUTPATIENT)
Dept: FAMILY MEDICINE | Facility: CLINIC | Age: 52
End: 2022-12-13
Payer: COMMERCIAL

## 2022-12-13 DIAGNOSIS — U07.1 CLINICAL DIAGNOSIS OF COVID-19: ICD-10-CM

## 2022-12-13 DIAGNOSIS — Z12.31 VISIT FOR SCREENING MAMMOGRAM: Primary | ICD-10-CM

## 2022-12-13 PROCEDURE — 99213 OFFICE O/P EST LOW 20 MIN: CPT | Mod: CS | Performed by: NURSE PRACTITIONER

## 2022-12-13 RX ORDER — BENZONATATE 200 MG/1
200 CAPSULE ORAL 3 TIMES DAILY PRN
Qty: 60 CAPSULE | Refills: 0 | Status: SHIPPED | OUTPATIENT
Start: 2022-12-13 | End: 2023-01-04

## 2022-12-13 ASSESSMENT — ENCOUNTER SYMPTOMS
FEVER: 1
RHINORRHEA: 1
MYALGIAS: 1
COUGH: 1
SHORTNESS OF BREATH: 1

## 2022-12-13 NOTE — PATIENT INSTRUCTIONS
COVID-19 Outpatient Treatments  Your care team can help you find the best treatments for COVID-19. Talk to a health care provider or refer to the FDA medicine fact sheets below.    Important: You can't have Paxlovid or molnupiravir if you're starting the medicine more than 5 days after your symptoms have started.  Paxlovid: https://www.fda.gov/media/234957/download  Molnupiravir (Lagevrio): https://www.fda.gov/media/924319/download  Paxlovid (nimatrelvir and ritonavir)  How it works  Two medicines (nirmatrelvir and ritonavir) are taken together. They stop the virus from growing. Less amount of virus is easier for your body to fight.  Benefits  Lowers risk of a hospital stay or death from COVID-19.  How to take    Medicine comes in a daily container with both medicine tablets. Take by mouth twice daily (once in the morning, once at night) for 5 days.    The number of tablets to take varies by patient.    Don't chew or break capsules. Swallow whole.  When to take  Take as soon as possible after positive COVID-19 test result, and within 5 days of your first symptoms.  Who can take it  Patients must be 12 years or older, weigh at least 88 pounds, and have tested positive for COVID-19. Paxlovid is the preferred treatment for pregnant patients.  Possible side effects  Can cause altered sense of taste, diarrhea (loose, watery stools), high blood pressure, muscle aches.  Medicine conflicts    Some medicines may conflict with Paxlovid and may cause serious side effects.    Tell your care team about all the medicines you take, including prescription and over-the-counter medicines, vitamins, and herbal supplements.    Your care team will review your medicines to make sure that you can safely take Paxlovid.  Cautions    Paxlovid is not advised for patients with severe kidney or liver disease. If you have kidney or liver problems, the dose may need to be changed.    If you're pregnant or breastfeeding, talk to your care team  about your options.    If you take hormonal birth control (such as the Pill), then you or your partner should also use a non-hormonal form of birth control (such as a condom). Keep doing this for 1 menstrual cycle after your last dose of Paxlovid.  Molnupiravir (Lagevrio)  How it works  Stops the virus from growing. Less amount of virus is easier for your body to fight.  Benefits  Lowers risk of a hospital stay or death from COVID-19.  How to take  Take 4 capsules by mouth every 12 hours (4 in the morning and 4 at night) for 5 days. Don't chew or break capsules. Swallow whole.  When to take  Take as soon as possible after positive COVID-19 test result, and within 5 days of your first symptoms.  Who can take it  Patients must be 18 years or older and have tested positive for COVID-19.  Possible side effects  Diarrhea (loose, watery stools), nausea (feeling sick to your stomach), dizziness, headaches.  Medicine conflicts  Right now, there are no known conflicts with other drugs. But tell your care team about all medicines you take.  Cautions    This medicine is not advised for patients who are pregnant.    If you are someone who could become pregnant, use trusted birth control until 4 days after your last dose of molnupiravir.    If your partner could become pregnant, you should use trusted birth control until 3 months after your last dose of molnupiravir.  For informational purposes only. Not to replace the advice of your health care provider. Copyright   2022 [facility]. All rights reserved. Clinically reviewed by Maye Gonzalez, PharmD, BCACP. [employer] [id number] - REV 12/22.

## 2022-12-13 NOTE — PROGRESS NOTES
Brenda is a 52 year old who is being evaluated via a billable telephone visit.      What phone number would you like to be contacted at? [phone]  How would you like to obtain your AVS? Kendy    Distant Location (provider location):  On-site    Assessment & Plan     Clinical diagnosis of COVID-19  Positive for COVID, discussed side effects, drug interactions. She is going to stop Sildenifil for 10 days, take 2.5 mg twice daily of amlodipine, discussed interactions with omeprazole. Normal kidney function.   - nirmatrelvir and ritonavir (PAXLOVID) therapy pack; Take 3 tablets by mouth 2 times daily for 5 days (Take 2 Nirmatrelvir tablets and 1 Ritonavir tablet twice daily for 5 days)  - benzonatate (TESSALON) 200 MG capsule; Take 1 capsule (200 mg) by mouth 3 times daily as needed for cough      Return in about 1 week (around 12/20/2022), or if symptoms worsen or fail to improve, for Follow up.    DANDRE Carrillo CNP  [facility]   Brenda is a 52 year old, presenting for the following health issues:  Covid Concern      HPI       COVID-19 Symptom Review  How many days ago did these symptoms start? X Friday evening, tested positive Sunday afternoon    Are any of the following symptoms significant for you?  New or worsening difficulty breathing? Yes    Please describe what kind of difficulty you are having breathing:Moderate dyspnea (short of breath with ADLs, shortness of breath that limits the ability to walk up one flight of stairs without needing to rest)    Worsening cough? Yes, it's a dry cough.     Fever or chills? Yes, the highest temperature was 101    Headache: YES    Sore throat: No    Chest pain: No    Diarrhea: No    Body aches? YES    What treatments has patient tried? advil   Does patient live in a nursing home, group home, or shelter? No  Does patient have a way to get food/medications during quarantined? Yes, I have a friend or family member who can help  me.        Fever started on Friday night, cough, headaches, malaise, body aches.      tested positive for Influenza A, her son has URI symptoms as well.     Review of Systems   Constitutional: Positive for fever.   HENT: Positive for congestion and rhinorrhea.    Respiratory: Positive for cough and shortness of breath.    Musculoskeletal: Positive for myalgias.   All other systems reviewed and are negative.           Objective           Vitals:  No vitals were obtained today due to virtual visit.    Physical Exam   healthy, alert and no distress  PSYCH: Alert and oriented times 3; coherent speech, normal   rate and volume, able to articulate logical thoughts, able   to abstract reason, no tangential thoughts, no hallucinations   or delusions  Her affect is normal  RESP: No cough, no audible wheezing, able to talk in full sentences  Remainder of exam unable to be completed due to telephone visits            Phone call duration: 10 minutes

## 2023-01-03 PROBLEM — N87.9 CERVICAL DYSPLASIA: Status: ACTIVE | Noted: 2023-01-03

## 2023-01-03 PROBLEM — F50.20 BULIMIA: Status: ACTIVE | Noted: 2023-01-03

## 2023-01-03 PROBLEM — M54.50 LOW BACK PAIN: Status: ACTIVE | Noted: 2023-01-03

## 2023-01-03 PROBLEM — E04.1 THYROID NODULE: Status: ACTIVE | Noted: 2023-01-03

## 2023-01-03 PROBLEM — M79.10 MUSCLE PAIN: Status: ACTIVE | Noted: 2023-01-03

## 2023-01-04 ENCOUNTER — OFFICE VISIT (OUTPATIENT)
Dept: FAMILY MEDICINE | Facility: CLINIC | Age: 53
End: 2023-01-04
Payer: COMMERCIAL

## 2023-01-04 DIAGNOSIS — Z12.4 CERVICAL CANCER SCREENING: ICD-10-CM

## 2023-01-04 DIAGNOSIS — B35.1 ONYCHOMYCOSIS: ICD-10-CM

## 2023-01-04 DIAGNOSIS — M79.10 MUSCULAR ACHES: ICD-10-CM

## 2023-01-04 DIAGNOSIS — I10 HYPERTENSION, UNSPECIFIED TYPE: ICD-10-CM

## 2023-01-04 DIAGNOSIS — Z00.00 HEALTHCARE MAINTENANCE: Primary | ICD-10-CM

## 2023-01-04 DIAGNOSIS — Z12.31 VISIT FOR SCREENING MAMMOGRAM: ICD-10-CM

## 2023-01-04 LAB
ALBUMIN SERPL BCG-MCNC: 4 G/DL (ref 3.5–5.2)
ALP SERPL-CCNC: 68 U/L (ref 35–104)
ALT SERPL W P-5'-P-CCNC: 44 U/L (ref 10–35)
ANION GAP SERPL CALCULATED.3IONS-SCNC: 10 MMOL/L (ref 7–15)
AST SERPL W P-5'-P-CCNC: 47 U/L (ref 10–35)
BILIRUB SERPL-MCNC: 0.6 MG/DL
BUN SERPL-MCNC: 12.9 MG/DL (ref 6–20)
CALCIUM SERPL-MCNC: 9.3 MG/DL (ref 8.6–10)
CHLORIDE SERPL-SCNC: 103 MMOL/L (ref 98–107)
CHOLEST SERPL-MCNC: 225 MG/DL
CREAT SERPL-MCNC: 0.7 MG/DL (ref 0.51–0.95)
DEPRECATED HCO3 PLAS-SCNC: 25 MMOL/L (ref 22–29)
ERYTHROCYTE [DISTWIDTH] IN BLOOD BY AUTOMATED COUNT: 13.6 % (ref 10–15)
GFR SERPL CREATININE-BSD FRML MDRD: >90 ML/MIN/1.73M2
GLUCOSE SERPL-MCNC: 79 MG/DL (ref 70–99)
HCT VFR BLD AUTO: 38 % (ref 35–47)
HDLC SERPL-MCNC: 88 MG/DL
HGB BLD-MCNC: 12.5 G/DL (ref 11.7–15.7)
LDLC SERPL CALC-MCNC: 113 MG/DL
MCH RBC QN AUTO: 32.4 PG (ref 26.5–33)
MCHC RBC AUTO-ENTMCNC: 32.9 G/DL (ref 31.5–36.5)
MCV RBC AUTO: 98 FL (ref 78–100)
NONHDLC SERPL-MCNC: 137 MG/DL
PLATELET # BLD AUTO: 228 10E3/UL (ref 150–450)
POTASSIUM SERPL-SCNC: 4 MMOL/L (ref 3.4–5.3)
PROT SERPL-MCNC: 7.1 G/DL (ref 6.4–8.3)
RBC # BLD AUTO: 3.86 10E6/UL (ref 3.8–5.2)
SODIUM SERPL-SCNC: 138 MMOL/L (ref 136–145)
TRIGL SERPL-MCNC: 121 MG/DL
TSH SERPL DL<=0.005 MIU/L-ACNC: 1.16 UIU/ML (ref 0.3–4.2)
WBC # BLD AUTO: 6.2 10E3/UL (ref 4–11)

## 2023-01-04 PROCEDURE — 82306 VITAMIN D 25 HYDROXY: CPT | Performed by: FAMILY MEDICINE

## 2023-01-04 PROCEDURE — 99214 OFFICE O/P EST MOD 30 MIN: CPT | Mod: 25 | Performed by: FAMILY MEDICINE

## 2023-01-04 PROCEDURE — 85027 COMPLETE CBC AUTOMATED: CPT | Performed by: FAMILY MEDICINE

## 2023-01-04 PROCEDURE — 80061 LIPID PANEL: CPT | Performed by: FAMILY MEDICINE

## 2023-01-04 PROCEDURE — 36415 COLL VENOUS BLD VENIPUNCTURE: CPT | Performed by: FAMILY MEDICINE

## 2023-01-04 PROCEDURE — 90471 IMMUNIZATION ADMIN: CPT | Performed by: FAMILY MEDICINE

## 2023-01-04 PROCEDURE — 86618 LYME DISEASE ANTIBODY: CPT | Performed by: FAMILY MEDICINE

## 2023-01-04 PROCEDURE — 80053 COMPREHEN METABOLIC PANEL: CPT | Performed by: FAMILY MEDICINE

## 2023-01-04 PROCEDURE — 87624 HPV HI-RISK TYP POOLED RSLT: CPT | Performed by: FAMILY MEDICINE

## 2023-01-04 PROCEDURE — 99396 PREV VISIT EST AGE 40-64: CPT | Mod: 25 | Performed by: FAMILY MEDICINE

## 2023-01-04 PROCEDURE — 84443 ASSAY THYROID STIM HORMONE: CPT | Performed by: FAMILY MEDICINE

## 2023-01-04 PROCEDURE — G0145 SCR C/V CYTO,THINLAYER,RESCR: HCPCS | Performed by: FAMILY MEDICINE

## 2023-01-04 PROCEDURE — 90682 RIV4 VACC RECOMBINANT DNA IM: CPT | Performed by: FAMILY MEDICINE

## 2023-01-04 RX ORDER — HYDROCHLOROTHIAZIDE 25 MG/1
25 TABLET ORAL DAILY
Qty: 90 TABLET | Refills: 3 | Status: SHIPPED | OUTPATIENT
Start: 2023-01-04 | End: 2023-12-11

## 2023-01-04 ASSESSMENT — ENCOUNTER SYMPTOMS
NAUSEA: 0
SORE THROAT: 0
DIARRHEA: 0
SHORTNESS OF BREATH: 0
CONSTIPATION: 0
WEAKNESS: 0
PARESTHESIAS: 0
DIZZINESS: 0
NERVOUS/ANXIOUS: 0
CHILLS: 0
ARTHRALGIAS: 1
MYALGIAS: 1
FREQUENCY: 0
HEMATOCHEZIA: 0
PALPITATIONS: 0
DYSURIA: 0
HEADACHES: 0
HEMATURIA: 0
BREAST MASS: 0
EYE PAIN: 0
COUGH: 0
FEVER: 0
ABDOMINAL PAIN: 0
HEARTBURN: 0
JOINT SWELLING: 0

## 2023-01-04 NOTE — PROGRESS NOTES
"Answers for HPI/ROS submitted by the patient on 1/4/2023  Frequency of exercise:: 4-5 days/week  Getting at least 3 servings of Calcium per day:: Yes  Diet:: Low salt  Taking medications regularly:: Yes  Bi-annual eye exam:: Yes  Dental care twice a year:: NO  Sleep apnea or symptoms of sleep apnea:: None  abdominal pain: No  Blood in stool: No  Blood in urine: No  chest pain: No  chills: No  congestion: No  constipation: No  cough: No  diarrhea: No  dizziness: No  ear pain: No  eye pain: No  nervous/anxious: No  fever: No  frequency: No  genital sores: No  headaches: No  hearing loss: No  heartburn: No  arthralgias: Yes  joint swelling: No  peripheral edema: No  mood changes: No  myalgias: Yes  nausea: No  dysuria: No  palpitations: No  Skin sensation changes: No  sore throat: No  urgency: No  rash: No  shortness of breath: No  visual disturbance: No  weakness: No  pelvic pain: No  vaginal bleeding: No  vaginal discharge: No  tenderness: No  breast mass: No  breast discharge: No  Additional concerns today:: Yes  Duration of exercise:: 30-45 minutes        Assessment/Plan:     Health maintenance female exam.  All questions answered.  Await pap smear results.  Breast self exam technique reviewed and patient encouraged to perform self-exam monthly.  Discussed healthy lifestyle modifications.  Mammogram ordered.  Await fasting lab results    BMI:   Estimated body mass index is 30.45 kg/m  as calculated from the following:    Height as of this encounter: 1.63 m (5' 4.17\").    Weight as of this encounter: 80.9 kg (178 lb 6 oz).   Weight management plan: Discussed healthy diet and exercise guidelines      Healthcare maintenance  Discussed the [facility] anibal..  Discussed physical activity and weight loss.  - INFLUENZA VACCINE 50-64 OR 18-64 W/EGG ALLERGY (FLUBLOK)  - COMPREHENSIVE METABOLIC PANEL  - CBC with Platelets  - Lipid panel reflex to direct LDL Non-fasting  - TSH with free T4 reflex  - Vitamin D Deficiency  - " COMPREHENSIVE METABOLIC PANEL  - CBC with Platelets  - Lipid panel reflex to direct LDL Non-fasting  - TSH with free T4 reflex  - Vitamin D Deficiency    Cervical cancer screening  - Pap screen with HPV - recommended age 30 - 65 years    Visit for screening mammogram  - MA SCREENING DIGITAL BILAT - Future  (s+30)    Hypertension, unspecified type  Blood pressure is quite elevated today.  Hydrochlorothiazide sent to the pharmacy as this may help with her swelling a bit as well.  I encouraged her to get blood pressure cuff at home and start checking her blood pressures at home on a regular basis.  Would recommend a repeat BMP in 2 weeks and she will schedule a lab appointment for that.  I have asked her to send me a [employer] message with blood pressure readings in a few weeks.  - hydrochlorothiazide (HYDRODIURIL) 25 MG tablet  Dispense: 90 tablet; Refill: 3    Muscular aches  Lyme's titer drawn  - Lyme Disease Total Abs Bld with Reflex to Confirm CLIA  - Lyme Disease Total Abs Bld with Reflex to Confirm CLIA    Onychomycosis  She will continue her over-the-counter treatments        Patient has been advised of split billing requirements and indicates understanding: Yes      Subjective:     Brenad Gunn is a 52 year old female who presents for an annual exam.  She is overall doing well.  I have not seen her in clinic for about 5 years.    She has a past medical history significant for fairly severe Raynaud's phenomenon.  She is currently on sildenafil and amlodipine for this which was prescribed by the rheumatologist whom she sees yearly.  She notes that she was unable to get the amlodipine for a little while and when she stopped taking it the swelling in her hands decree significantly.  She is wondering about not taking that medication.  She is unsure if her Raynaud's has worsened since she stopped taking it.  She plans on following up with rheumatology later this month.    Family history of hypertension: Patient  blood pressure today is markedly elevated in the 180s over 90s.  Last blood pressure we have on file is from about 3 years ago and was 130/80.  Her appointments since that time have been virtual.  She states she has not checked her blood pressure at home or at the dentist office/other office.  Patient does states she gets nervous when she comes to the doctor and blood pressure can be sometimes increased.  She also wonders if blood pressures increase because she not taking her Norvasc currently.  No chest pain or shortness of breath.    Patient has been using a antifungal lacquer on her toenails bilaterally which is working slightly.  She wonders about any other treatments for onychomycosis.    Lastly, the patient was bit by a tick this last spring.  She has been feeling a bit tired and achy since then and wondering about Lyme's test.        Healthy Habits:   Regular Exercise: Yes  Sunscreen Use: Yes  Healthy Diet: yes  Dental Visits Regularly: yes  Seat Belt: Yes  Self Breast Exam Monthly: yes  Colonoscopy: UTD  Prevention of Osteoporosis: yes      Immunization History   Administered Date(s) Administered     COVID-19 Vaccine 18+ (Moderna) 01/31/2021, 02/27/2021, 12/09/2021     DTaP, Unspecified 08/18/1981     Historical mumps 11/16/1979     Influenza (IIV3) PF 11/19/2003, 10/05/2012, 11/01/2013     Influenza Vaccine 50-64 or 18-64 w/egg allergy (Flublok) 01/04/2023     Influenza Vaccine >6 months (Alfuria,Fluzone) 08/27/2018     Influenza Vaccine Im 4yrs+ 4 Valent CCIIV4 09/28/2021     Influenza Vaccine, 6+MO IM (QUADRIVALENT W/PRESERVATIVES) 09/18/2019, 09/15/2020     Poliovirus, inactivated (IPV) 08/18/1981     Rubella 08/18/1981     TD (ADULT, 7+) 06/26/2019     Td (Adult), Adsorbed 11/01/1999     Tdap (Adacel,Boostrix) 04/28/2009     Zoster vaccine recombinant adjuvanted (SHINGRIX) 10/02/2020, 12/11/2020         Gynecologic History  No LMP recorded (lmp unknown). Patient is postmenopausal.  Contraception: post  menopausal status  Last Pap: 2018. Results were: normal  Last mammogram: 2019. Results were: normal      OB History    Para Term  AB Living   2 2 2 0 0 0   SAB IAB Ectopic Multiple Live Births   0 0 0 0 0      # Outcome Date GA Lbr [place]/2nd Weight Sex Delivery Anes PTL Lv   2 Term            1 Term                Current Outpatient Medications   Medication Sig Dispense Refill     amLODIPine (NORVASC) 5 MG tablet TAKE ONE TABLET BY MOUTH TWICE A  tablet 0     hydrochlorothiazide (HYDRODIURIL) 25 MG tablet Take 1 tablet (25 mg) by mouth daily 90 tablet 3     ibuprofen (ADVIL/MOTRIN) 200 MG tablet        latanoprost (XALATAN) 0.005 % ophthalmic solution        multivitamin w/minerals (THERA-VIT-M) tablet Take 1 tablet by mouth daily       omeprazole (PRILOSEC) 20 MG DR capsule TAKE ONE CAPSULE BY MOUTH ONCE DAILY 90 capsule 0     sildenafil (REVATIO) 20 MG tablet TAKE ONE TABLET BY MOUTH THREE TIMES A  tablet 0     No past medical history on file.  No past surgical history on file.  Contrast dye and Norgestrel-ethinyl estradiol  Family History   Problem Relation Age of Onset     Cancer Mother         thyroid     Cancer Father         prostate     Cancer Maternal Aunt         thyroid cancer     Colon Cancer Paternal Grandmother      Breast Cancer Paternal Aunt 38.00     Social History     Socioeconomic History     Marital status:      Spouse name: Not on file     Number of children: Not on file     Years of education: Not on file     Highest education level: Not on file   Occupational History     Not on file   Tobacco Use     Smoking status: Never     Smokeless tobacco: Never   Substance and Sexual Activity     Alcohol use: Yes     Alcohol/week: 5.0 - 6.0 standard drinks     Drug use: Not on file     Sexual activity: Not on file   Other Topics Concern     Not on file   Social History Narrative     Not on file     Social Determinants of Health     Financial Resource Strain: Not on file  "  Food Insecurity: Not on file   Transportation Needs: Not on file   Physical Activity: Not on file   Stress: Not on file   Social Connections: Not on file   Intimate Partner Violence: Not on file   Housing Stability: Not on file       Review of Systems  12 point review of systems was completed and found to be negative except for what is been stated above.      Objective:      Vitals:    01/04/23 1530 01/05/23 0653   BP: (!) 182/94 (!) 180/88   Pulse: 72    Resp: 12    Temp: 98.1  F (36.7  C)    TempSrc: Tympanic    Weight: 80.9 kg (178 lb 6 oz)    Height: 1.63 m (5' 4.17\")          Physical Exam:  General Appearance: Alert, cooperative, no distress, appears stated age   Head: Normocephalic, without obvious abnormality, atraumatic  Eyes: PERRL, conjunctiva/corneas clear, EOM's intact   Ears: Normal TM's and external ear canals, both ears  Neck: Supple, symmetrical, trachea midline, no adenopathy;  thyroid: not enlarged, symmetric, no tenderness/mass/nodules  Back: Symmetric, no curvature, ROM normal,  Lungs: Clear to auscultation bilaterally, respirations unlabored  Breasts: No breast masses, tenderness, asymmetry, or nipple discharge.  Heart: Regular rate and rhythm, S1 and S2 normal, no murmur, rub, or gallop  Abdomen: Soft, non-tender, bowel sounds active all four quadrants,  no masses, no organomegaly  Pelvic:normal external female genitalia, normal appearing vaginal mucosa and cervix  Extremities: Extremities normal, atraumatic, no cyanosis or edema  Skin: Skin color, texture, turgor normal, no rashes or lesions, mild onychomycosis to bilateral great toes  Lymph nodes: Cervical, supraclavicular, and axillary nodes normal and   Neurologic: Normal     "

## 2023-01-05 VITALS
HEIGHT: 64 IN | DIASTOLIC BLOOD PRESSURE: 88 MMHG | SYSTOLIC BLOOD PRESSURE: 180 MMHG | WEIGHT: 178.38 LBS | TEMPERATURE: 98.1 F | BODY MASS INDEX: 30.45 KG/M2 | HEART RATE: 72 BPM | RESPIRATION RATE: 12 BRPM

## 2023-01-05 LAB
B BURGDOR IGG+IGM SER QL: 0.18
DEPRECATED CALCIDIOL+CALCIFEROL SERPL-MC: 74 UG/L (ref 20–75)

## 2023-01-09 LAB
BKR LAB AP GYN ADEQUACY: NORMAL
BKR LAB AP GYN INTERPRETATION: NORMAL
BKR LAB AP HPV REFLEX: NORMAL
BKR LAB AP PREVIOUS ABNORMAL: NORMAL
PATH REPORT.COMMENTS IMP SPEC: NORMAL
PATH REPORT.COMMENTS IMP SPEC: NORMAL
PATH REPORT.RELEVANT HX SPEC: NORMAL

## 2023-01-10 LAB
HUMAN PAPILLOMA VIRUS 16 DNA: NEGATIVE
HUMAN PAPILLOMA VIRUS 18 DNA: NEGATIVE
HUMAN PAPILLOMA VIRUS FINAL DIAGNOSIS: NORMAL
HUMAN PAPILLOMA VIRUS OTHER HR: NEGATIVE

## 2023-01-17 DIAGNOSIS — R12 HEARTBURN: ICD-10-CM

## 2023-01-17 DIAGNOSIS — I73.00 RAYNAUD'S DISEASE WITHOUT GANGRENE: ICD-10-CM

## 2023-01-26 ENCOUNTER — TELEPHONE (OUTPATIENT)
Dept: RHEUMATOLOGY | Facility: CLINIC | Age: 53
End: 2023-01-26

## 2023-01-26 ENCOUNTER — OFFICE VISIT (OUTPATIENT)
Dept: RHEUMATOLOGY | Facility: CLINIC | Age: 53
End: 2023-01-26
Payer: COMMERCIAL

## 2023-01-26 VITALS
SYSTOLIC BLOOD PRESSURE: 140 MMHG | WEIGHT: 179.1 LBS | HEART RATE: 88 BPM | HEIGHT: 64 IN | BODY MASS INDEX: 30.58 KG/M2 | DIASTOLIC BLOOD PRESSURE: 84 MMHG

## 2023-01-26 DIAGNOSIS — R12 HEARTBURN: ICD-10-CM

## 2023-01-26 DIAGNOSIS — I73.00 RAYNAUD'S DISEASE WITHOUT GANGRENE: Primary | ICD-10-CM

## 2023-01-26 PROCEDURE — 99214 OFFICE O/P EST MOD 30 MIN: CPT | Performed by: INTERNAL MEDICINE

## 2023-01-26 RX ORDER — SILDENAFIL CITRATE 20 MG/1
20 TABLET ORAL 3 TIMES DAILY
Qty: 270 TABLET | Refills: 0 | Status: SHIPPED | OUTPATIENT
Start: 2023-01-26 | End: 2023-09-29

## 2023-01-26 RX ORDER — AMLODIPINE BESYLATE 5 MG/1
5 TABLET ORAL 2 TIMES DAILY
Qty: 180 TABLET | Refills: 1 | Status: SHIPPED | OUTPATIENT
Start: 2023-01-26 | End: 2023-07-18

## 2023-01-26 RX ORDER — AMLODIPINE BESYLATE 5 MG/1
TABLET ORAL
Qty: 180 TABLET | Refills: 0 | OUTPATIENT
Start: 2023-01-26

## 2023-01-26 NOTE — TELEPHONE ENCOUNTER
Prior Authorization Retail Medication Request    Medication/Dose: Sildenafil 20mg  ICD code (if different than what is on RX):  I73.00  Previously Tried and Failed:    Rationale:      Insurance Name:  [employer]  Insurance ID:  [id number]        See Adams  Pharmacy Technician, [facility]  Phone: [phone]  Fax: [phone]

## 2023-01-26 NOTE — PROGRESS NOTES
"      Rheumatology follow-up visit note     Brenda is a 52 year old female presents today for follow-up.    Brenda was seen today for recheck.    Diagnoses and all orders for this visit:    Raynaud's disease without gangrene  -     amLODIPine (NORVASC) 5 MG tablet; Take 1 tablet (5 mg) by mouth 2 times daily  -     sildenafil (REVATIO) 20 MG tablet; Take 1 tablet (20 mg) by mouth 3 times daily  -     omeprazole (PRILOSEC) 20 MG DR capsule; Take 1 capsule (20 mg) by mouth daily    Heartburn  -     omeprazole (PRILOSEC) 20 MG DR capsule; Take 1 capsule (20 mg) by mouth daily        This patient with severe Raynaud's, reflux symptoms, without sclerodactyly, telangiectasia, and without signals of autoimmunity including SIMONE, SCL 70, anticentromere has continued well with the current regimen of amlodipine sildenafil and omeprazole, she is to continue, her ulcers in the fingertips of healed.  We will meet her in 12 months or sooner.    Follow up in 1 year.    HPI    Brenda Gunn is a 52 year old female is here for follow-up of  Raynaud's which can be quite severe.  At times leading to ulceration of fingertips with open sores, also heartburn reflux symptoms. She is on sildenafil, amlodipine.  She does get heartburn for which she takes over-the-counter measures.  She has not noticed thickening of the skin.  There are occasional mouth ulcers.  No joint pains.  Her autoantibody profile was negative for SIMONE, SCL 70, and negative anticentromere.  She noted no shortness of breath.  No edema of the ankles.   During summer months her symptoms were improved,  she takes twice daily sildenafil.  The Norvasc remains the same.        DETAILED EXAMINATION  01/26/23  :    Vitals:    01/26/23 1056   BP: (!) 140/84   BP Location: Right arm   Patient Position: Sitting   Cuff Size: Adult Regular   Pulse: 88   Weight: 81.2 kg (179 lb 1.6 oz)   Height: 1.63 m (5' 4.17\")     Alert oriented. Head including the face is examined for malar rash, " heliotropes, scarring, lupus pernio. Eyes examined for redness such as in episcleritis/scleritis, periorbital lesions.   Neck/ Face examined for parotid gland swelling, range of motion of neck.  Left upper and lower and right upper and lower extremities examined for tenderness, swelling, warmth of the appendicular joints, range of motion, edema, rash.  Some of the important findings included: she does not have evidence of synovitis in the palpable joints of the upper extremities.  No significant deformities of the digits.  She does not have of fingertip ulcerations.  She does not have sclerodactyly.  She does not have telangiectasias.  She has bluish discoloration of the left index and middle finger which tends to get a bit better in warmer environments.  NO [place] nodes.  Range of motion of the shoulders  show full abduction.  No JLT effusion or warmth of the knees.  she does not have dactylitis of the digits.     Patient Active Problem List    Diagnosis Date Noted     Thyroid nodule 01/03/2023     Priority: Medium     Formatting of this note might be different from the original.  ?? L upper lobe       Muscle pain 01/03/2023     Priority: Medium     Formatting of this note might be different from the original.  ? Fibromyalgia;  None if she is active 3x a week       Low back pain 01/03/2023     Priority: Medium     Formatting of this note might be different from the original.  (lumbar disc disorder) occ. None if she is active 3x a week       Cervical dysplasia 01/03/2023     Priority: Medium     Formatting of this note might be different from the original.  LEEP 1995       Bulimia 01/03/2023     Priority: Medium     Formatting of this note might be different from the original.  not since 1994       Heartburn 05/09/2017     Priority: Medium     Raynaud's disease without gangrene 09/20/2016     Priority: Medium     Ischemic ulcer of finger, limited to breakdown of skin (H) 09/20/2016     Priority: Medium      Bilateral lower extremity edema 09/20/2016     Priority: Medium     Breast mass, left 11/02/2012     Priority: Medium     Formatting of this note might be different from the original.  Breast mass, left 10-12; lower outer quadrant  2.5x1 cm firm;  Neg mammogram 10-12  Sent to [facility] center 11-12;  US--cysts. Saw Dr Cox; will recheck in Feb;  Was a cyst  Resolved ;       No past surgical history on file.   No past medical history on file.  Allergies   Allergen Reactions     Contrast Dye      PN: LW CM1: CONTRAST- nka Reaction :     Norgestrel-Ethinyl Estradiol      Other reaction(s): Hypertension  HTN from BCPs     Current Outpatient Medications   Medication Sig Dispense Refill     amLODIPine (NORVASC) 5 MG tablet TAKE ONE TABLET BY MOUTH TWICE A  tablet 0     hydrochlorothiazide (HYDRODIURIL) 25 MG tablet Take 1 tablet (25 mg) by mouth daily 90 tablet 3     ibuprofen (ADVIL/MOTRIN) 200 MG tablet        latanoprost (XALATAN) 0.005 % ophthalmic solution        multivitamin w/minerals (THERA-VIT-M) tablet Take 1 tablet by mouth daily       omeprazole (PRILOSEC) 20 MG DR capsule TAKE ONE CAPSULE BY MOUTH ONCE DAILY 90 capsule 0     sildenafil (REVATIO) 20 MG tablet TAKE ONE TABLET BY MOUTH THREE TIMES A  tablet 0     VITAMIN D PO Take 1 tablet by mouth daily       family history includes Breast Cancer (age of onset: 38.00) in her paternal aunt; Cancer in her father, maternal aunt, and mother; Colon Cancer in her paternal grandmother.  Social Connections: Not on file          WBC Count   Date Value Ref Range Status   01/04/2023 6.2 4.0 - 11.0 10e3/uL Final     RBC Count   Date Value Ref Range Status   01/04/2023 3.86 3.80 - 5.20 10e6/uL Final     Hemoglobin   Date Value Ref Range Status   01/04/2023 12.5 11.7 - 15.7 g/dL Final     Hematocrit   Date Value Ref Range Status   01/04/2023 38.0 35.0 - 47.0 % Final     MCV   Date Value Ref Range Status   01/04/2023 98 78 - 100 fL Final     MCH   Date  Value Ref Range Status   01/04/2023 32.4 26.5 - 33.0 pg Final     Platelet Count   Date Value Ref Range Status   01/04/2023 228 150 - 450 10e3/uL Final     % Lymphocytes   Date Value Ref Range Status   08/27/2018 20 20 - 40 % Final     AST   Date Value Ref Range Status   01/04/2023 47 (H) 10 - 35 U/L Final     ALT   Date Value Ref Range Status   01/04/2023 44 (H) 10 - 35 U/L Final     Albumin   Date Value Ref Range Status   01/04/2023 4.0 3.5 - 5.2 g/dL Final   08/04/2021 4.0 3.5 - 5.0 g/dL Final     Alkaline Phosphatase   Date Value Ref Range Status   01/04/2023 68 35 - 104 U/L Final     Creatinine   Date Value Ref Range Status   01/04/2023 0.70 0.51 - 0.95 mg/dL Final     GFR Estimate   Date Value Ref Range Status   01/04/2023 >90 >60 mL/min/1.73m2 Final     Comment:     Effective December 21, 2021 eGFRcr in adults is calculated using the 2021 CKD-EPI creatinine equation which includes age and gender (Vi et al., NEJM, DOI: 10.1056/KVXAua0197663)   11/16/2020 >60 >60 mL/min/1.73m2 Final     GFR Estimate If Black   Date Value Ref Range Status   11/16/2020 >60 >60 mL/min/1.73m2 Final

## 2023-01-30 NOTE — TELEPHONE ENCOUNTER
Central Prior Authorization Team - Phone: [phone]     PA Initiation    Medication: Sildenafil 20mg- [place] INITIATED  Insurance Company:    Pharmacy Filling the Rx: [facility] - [id number]  Filling Pharmacy Phone: [phone]  Filling Pharmacy Fax:    Start Date: 1/30/2023

## 2023-01-31 NOTE — TELEPHONE ENCOUNTER
Central Prior Authorization Team - Phone: [phone]     Prior Authorization Approval    Authorization Effective Date: 1/31/2023  Authorization Expiration Date: 1/30/2024  Medication: Sildenafil 20mg- PA approved  Approved Dose/Quantity: 270  Reference #:     Insurance Company:    Expected CoPay:       CoPay Card Available:      Foundation Assistance Needed:    Which Pharmacy is filling the prescription (Not needed for infusion/clinic administered): [facility] [place], [place] - [address]  Pharmacy Notified: Yes  Patient Notified: YesComment:  pharmacy will notify when ready

## 2023-07-18 DIAGNOSIS — I73.00 RAYNAUD'S DISEASE WITHOUT GANGRENE: ICD-10-CM

## 2023-07-18 DIAGNOSIS — R12 HEARTBURN: ICD-10-CM

## 2023-07-18 RX ORDER — AMLODIPINE BESYLATE 5 MG/1
TABLET ORAL
Qty: 180 TABLET | Refills: 1 | Status: SHIPPED | OUTPATIENT
Start: 2023-07-18 | End: 2024-01-09

## 2023-08-27 ENCOUNTER — HEALTH MAINTENANCE LETTER (OUTPATIENT)
Age: 53
End: 2023-08-27

## 2023-09-28 ENCOUNTER — TELEPHONE (OUTPATIENT)
Dept: RHEUMATOLOGY | Facility: CLINIC | Age: 53
End: 2023-09-28
Payer: COMMERCIAL

## 2023-09-28 DIAGNOSIS — I73.00 RAYNAUD'S DISEASE WITHOUT GANGRENE: ICD-10-CM

## 2023-09-29 RX ORDER — SILDENAFIL CITRATE 20 MG/1
20 TABLET ORAL 3 TIMES DAILY
Qty: 270 TABLET | Refills: 0 | Status: SHIPPED | OUTPATIENT
Start: 2023-09-29 | End: 2023-12-27

## 2023-10-16 NOTE — TELEPHONE ENCOUNTER
Multiple attempts have been made to contact patient to schedule with no return call from the patient.

## 2023-12-05 ENCOUNTER — PATIENT OUTREACH (OUTPATIENT)
Dept: CARE COORDINATION | Facility: CLINIC | Age: 53
End: 2023-12-05
Payer: COMMERCIAL

## 2023-12-11 DIAGNOSIS — I10 HYPERTENSION, UNSPECIFIED TYPE: ICD-10-CM

## 2023-12-11 RX ORDER — HYDROCHLOROTHIAZIDE 25 MG/1
25 TABLET ORAL DAILY
Qty: 90 TABLET | Refills: 0 | Status: SHIPPED | OUTPATIENT
Start: 2023-12-11 | End: 2024-03-11

## 2023-12-19 ENCOUNTER — PATIENT OUTREACH (OUTPATIENT)
Dept: CARE COORDINATION | Facility: CLINIC | Age: 53
End: 2023-12-19
Payer: COMMERCIAL

## 2023-12-24 DIAGNOSIS — I73.00 RAYNAUD'S DISEASE WITHOUT GANGRENE: ICD-10-CM

## 2023-12-27 ENCOUNTER — ANCILLARY PROCEDURE (OUTPATIENT)
Dept: MAMMOGRAPHY | Facility: HOSPITAL | Age: 53
End: 2023-12-27
Attending: FAMILY MEDICINE
Payer: COMMERCIAL

## 2023-12-27 DIAGNOSIS — Z12.31 VISIT FOR SCREENING MAMMOGRAM: ICD-10-CM

## 2023-12-27 PROCEDURE — 77067 SCR MAMMO BI INCL CAD: CPT

## 2023-12-27 RX ORDER — SILDENAFIL CITRATE 20 MG/1
20 TABLET ORAL 3 TIMES DAILY
Qty: 90 TABLET | Refills: 0 | Status: SHIPPED | OUTPATIENT
Start: 2023-12-27 | End: 2024-01-25

## 2024-01-03 NOTE — ADDENDUM NOTE
Addendum Note by Sendy Reynolds RN at 9/29/2020  3:59 PM     Author: Sendy Reynolds RN Service: -- Author Type: Registered Nurse    Filed: 9/29/2020  3:59 PM Encounter Date: 9/28/2020 Status: Signed    : Sendy Reynolds RN (Registered Nurse)    Addended by: SENDY REYNOLDS on: 9/29/2020 03:59 PM        Modules accepted: Orders         Message  Received: Yesterday  Geraldine Hayes RN  P Gi Surg Sched O[facility] Colon Pool [facility]  Cc: P Gi Surg Sched Pool - KAITLIN Shields,    Due to severe NILTON, patient should be scheduled at the hospital per anesthesia.    Thank You,  Lori Hayes RN  Supervisor [facility]

## 2024-01-08 ENCOUNTER — ANCILLARY PROCEDURE (OUTPATIENT)
Dept: MAMMOGRAPHY | Facility: CLINIC | Age: 54
End: 2024-01-08
Attending: FAMILY MEDICINE
Payer: COMMERCIAL

## 2024-01-08 DIAGNOSIS — N64.89 BREAST ASYMMETRY: ICD-10-CM

## 2024-01-08 PROCEDURE — 77062 BREAST TOMOSYNTHESIS BI: CPT

## 2024-01-08 PROCEDURE — 76642 ULTRASOUND BREAST LIMITED: CPT | Mod: LT

## 2024-01-09 DIAGNOSIS — I73.00 RAYNAUD'S DISEASE WITHOUT GANGRENE: ICD-10-CM

## 2024-01-09 DIAGNOSIS — R12 HEARTBURN: ICD-10-CM

## 2024-01-09 RX ORDER — AMLODIPINE BESYLATE 5 MG/1
TABLET ORAL
Qty: 60 TABLET | Refills: 0 | Status: SHIPPED | OUTPATIENT
Start: 2024-01-09 | End: 2024-01-25

## 2024-01-09 NOTE — TELEPHONE ENCOUNTER
Refilled per Rheum RN refill protocol  Due for f/up appt for additional refills, appt 1/25/24 with Dr Katz

## 2024-01-19 DIAGNOSIS — I73.00 RAYNAUD'S DISEASE WITHOUT GANGRENE: ICD-10-CM

## 2024-01-25 ENCOUNTER — OFFICE VISIT (OUTPATIENT)
Dept: RHEUMATOLOGY | Facility: CLINIC | Age: 54
End: 2024-01-25
Payer: COMMERCIAL

## 2024-01-25 VITALS
HEART RATE: 90 BPM | BODY MASS INDEX: 31.48 KG/M2 | WEIGHT: 184.4 LBS | SYSTOLIC BLOOD PRESSURE: 140 MMHG | OXYGEN SATURATION: 98 % | DIASTOLIC BLOOD PRESSURE: 92 MMHG

## 2024-01-25 DIAGNOSIS — R12 HEARTBURN: ICD-10-CM

## 2024-01-25 DIAGNOSIS — I73.00 RAYNAUD'S DISEASE WITHOUT GANGRENE: ICD-10-CM

## 2024-01-25 PROCEDURE — 99214 OFFICE O/P EST MOD 30 MIN: CPT | Performed by: INTERNAL MEDICINE

## 2024-01-25 RX ORDER — SILDENAFIL CITRATE 20 MG/1
20 TABLET ORAL 3 TIMES DAILY
Qty: 270 TABLET | Refills: 1 | Status: SHIPPED | OUTPATIENT
Start: 2024-01-25 | End: 2024-04-24

## 2024-01-25 RX ORDER — AMLODIPINE BESYLATE 5 MG/1
5 TABLET ORAL 2 TIMES DAILY
Qty: 180 TABLET | Refills: 1 | Status: SHIPPED | OUTPATIENT
Start: 2024-02-06 | End: 2024-09-12

## 2024-01-25 NOTE — PROGRESS NOTES
Rheumatology follow-up visit note     Brenda is a 53 year old female presents today for follow-up.    Brenda was seen today for recheck.    Diagnoses and all orders for this visit:    Raynaud's disease without gangrene  -     sildenafil (REVATIO) 20 MG tablet; Take 1 tablet (20 mg) by mouth 3 times daily for 90 days  -     amLODIPine (NORVASC) 5 MG tablet; Take 1 tablet (5 mg) by mouth 2 times daily for 90 days  -     omeprazole (PRILOSEC) 20 MG DR capsule; Take 1 capsule (20 mg) by mouth daily for 90 days    Heartburn  -     omeprazole (PRILOSEC) 20 MG DR capsule; Take 1 capsule (20 mg) by mouth daily for 90 days      Her severe Raynaud's given this season's moderate weather has not been as bad, reflux under control with   Prilosec him.  He is also on amlodipine along with sildenafil for the Raynaud's and its complications.  As noted previously there are no signals of autoimmunity such as SIMONE SCL 70 centromere antibodies she is to stay the course will meet in a years time.    Follow up in 1 year.    HPI    Brenda ZAMBRANO Angelafeliciano is a 53 year old female is here for follow-up of  Raynaud's which can be quite severe.  At times leading to ulceration of fingertips with open sores, also heartburn reflux symptoms. She is on sildenafil, amlodipine.  She does get heartburn for which she takes over-the-counter measures.  She has not noticed thickening of the skin.  There are occasional mouth ulcers.  No joint pains.  Her autoantibody profile was negative for SIMONE, SCL 70, and negative anticentromere.  She noted no shortness of breath.  No edema of the ankles.   During summer months her symptoms were improved,  she takes twice daily sildenafil.  The Norvasc remains the same.               DETAILED EXAMINATION  01/25/24  :    Vitals:    01/25/24 1352   BP: (!) 140/92   Pulse: 90   SpO2: 98%   Weight: 83.6 kg (184 lb 6.4 oz)     Alert oriented. Head including the face is examined for malar rash, heliotropes, scarring, lupus pernio.  Eyes examined for redness such as in episcleritis/scleritis, periorbital lesions.   Neck/ Face examined for parotid gland swelling, range of motion of neck.  Left upper and lower and right upper and lower extremities examined for tenderness, swelling, warmth of the appendicular joints, range of motion, edema, rash.  Some of the important findings included: she does not have evidence of synovitis in the palpable joints of the upper extremities.  No significant deformities of the digits.  no Heberden nodes.  Range of motion of the shoulders   show full abduction.  No JLT effusion or warmth of the knees.  she does not have dactylitis of the digits.  She does not have loss of finger pulp, ulceration.  Past ulcerations residual is seen in the form of the pitting.  While her digits are shiny there is no clear sclerodactyly.     Patient Active Problem List    Diagnosis Date Noted    Thyroid nodule 01/03/2023     Priority: Medium     Formatting of this note might be different from the original.  ?? L upper lobe      Muscle pain 01/03/2023     Priority: Medium     Formatting of this note might be different from the original.  ? Fibromyalgia;  None if she is active 3x a week      Low back pain 01/03/2023     Priority: Medium     Formatting of this note might be different from the original.  (lumbar disc disorder) occ. None if she is active 3x a week      Cervical dysplasia 01/03/2023     Priority: Medium     Formatting of this note might be different from the original.  LEEP 1996      Bulimia (H28) 01/03/2023     Priority: Medium     Formatting of this note might be different from the original.  not since 1994      Heartburn 05/09/2017     Priority: Medium    Raynaud's disease without gangrene 09/20/2016     Priority: Medium    Ischemic ulcer of finger, limited to breakdown of skin (H) 09/20/2016     Priority: Medium    Bilateral lower extremity edema 09/20/2016     Priority: Medium    Breast mass, left 11/02/2012      Priority: Medium     Formatting of this note might be different from the original.  Breast mass, left 10-12; lower outer quadrant  2.5x1 cm firm;  Neg mammogram 10-12  Sent to [facility] 11-12;  US--cysts. Saw Dr Cox; will recheck in Feb;  Was a cyst  Resolved ;       No past surgical history on file.   No past medical history on file.  Allergies   Allergen Reactions    Contrast Dye      PN: LW CM1: CONTRAST- nka Reaction :    Norgestrel-Ethinyl Estradiol      Other reaction(s): Hypertension  HTN from [facility]     Current Outpatient Medications   Medication Sig Dispense Refill    amLODIPine (NORVASC) 5 MG tablet TAKE ONE TABLET BY MOUTH TWICE A DAY 60 tablet 0    hydrochlorothiazide (HYDRODIURIL) 25 MG tablet TAKE ONE TABLET BY MOUTH ONCE DAILY 90 tablet 0    ibuprofen (ADVIL/MOTRIN) 200 MG tablet       latanoprost (XALATAN) 0.005 % ophthalmic solution       multivitamin w/minerals (THERA-VIT-M) tablet Take 1 tablet by mouth daily      omeprazole (PRILOSEC) 20 MG DR capsule TAKE ONE CAPSULE BY MOUTH ONCE DAILY 30 capsule 0    sildenafil (REVATIO) 20 MG tablet TAKE ONE TABLET BY MOUTH THREE TIMES A DAY 90 tablet 0    VITAMIN D PO Take 1 tablet by mouth daily       family history includes Breast Cancer (age of onset: 38.00) in her paternal aunt; Cancer in her father, maternal aunt, and mother; Colon Cancer in her paternal grandmother.  Social Connections: Not on file          WBC Count   Date Value Ref Range Status   01/04/2023 6.2 4.0 - 11.0 10e3/uL Final     RBC Count   Date Value Ref Range Status   01/04/2023 3.86 3.80 - 5.20 10e6/uL Final     Hemoglobin   Date Value Ref Range Status   01/04/2023 12.5 11.7 - 15.7 g/dL Final     Hematocrit   Date Value Ref Range Status   01/04/2023 38.0 35.0 - 47.0 % Final     MCV   Date Value Ref Range Status   01/04/2023 98 78 - 100 fL Final     MCH   Date Value Ref Range Status   01/04/2023 32.4 26.5 - 33.0 pg Final     Platelet Count   Date Value Ref Range Status    01/04/2023 228 150 - 450 10e3/uL Final     % Lymphocytes   Date Value Ref Range Status   08/27/2018 20 20 - 40 % Final     AST   Date Value Ref Range Status   01/04/2023 47 (H) 10 - 35 U/L Final     ALT   Date Value Ref Range Status   01/04/2023 44 (H) 10 - 35 U/L Final     Albumin   Date Value Ref Range Status   01/04/2023 4.0 3.5 - 5.2 g/dL Final   08/04/2021 4.0 3.5 - 5.0 g/dL Final     Alkaline Phosphatase   Date Value Ref Range Status   01/04/2023 68 35 - 104 U/L Final     Creatinine   Date Value Ref Range Status   01/04/2023 0.70 0.51 - 0.95 mg/dL Final     GFR Estimate   Date Value Ref Range Status   01/04/2023 >90 >60 mL/min/1.73m2 Final     Comment:     Effective December 21, 2021 eGFRcr in adults is calculated using the 2021 CKD-EPI creatinine equation which includes age and gender (Vi et al., NEJM, DOI: 10.1056/ZUSAzx3370948)   11/16/2020 >60 >60 mL/min/1.73m2 Final     GFR Estimate If Black   Date Value Ref Range Status   11/16/2020 >60 >60 mL/min/1.73m2 Final

## 2024-01-26 RX ORDER — SILDENAFIL CITRATE 20 MG/1
20 TABLET ORAL 3 TIMES DAILY
Qty: 90 TABLET | Refills: 0 | OUTPATIENT
Start: 2024-01-26

## 2024-03-24 ENCOUNTER — HEALTH MAINTENANCE LETTER (OUTPATIENT)
Age: 54
End: 2024-03-24

## 2024-05-29 ENCOUNTER — TELEPHONE (OUTPATIENT)
Dept: RHEUMATOLOGY | Facility: CLINIC | Age: 54
End: 2024-05-29

## 2024-06-07 NOTE — TELEPHONE ENCOUNTER
Central Prior Authorization Team - Phone: [phone]     PA Initiation    Medication: SILDENAFIL CITRATE 20 MG PO TABS  Insurance Company: Comment:  [employer]  Pharmacy Filling the Rx: [facility] - [id number]  Filling Pharmacy Phone: [phone]  Filling Pharmacy Fax:    Start Date: 6/7/2024

## 2024-06-09 DIAGNOSIS — I10 HYPERTENSION, UNSPECIFIED TYPE: ICD-10-CM

## 2024-06-10 RX ORDER — HYDROCHLOROTHIAZIDE 25 MG/1
TABLET ORAL
Qty: 30 TABLET | Refills: 0 | Status: SHIPPED | OUTPATIENT
Start: 2024-06-10 | End: 2024-07-05

## 2024-06-10 NOTE — TELEPHONE ENCOUNTER
L/M for pt to schedule appt and then ask for care team to get routed to Dr. Samuels for limited refills until appt time.

## 2024-06-10 NOTE — TELEPHONE ENCOUNTER
Central Prior Authorization Team - Phone: [phone]     Prior Authorization Approval    Medication: SILDENAFIL CITRATE 20 MG PO TABS  Authorization Effective Date: 6/7/2024  Authorization Expiration Date: 6/7/2025  Approved Dose/Quantity: 270  Reference #:     Insurance Company: Comment:  [employer]  Expected CoPay: $    CoPay Card Available:      Financial Assistance Needed:   Which Pharmacy is filling the prescription: [facility] [place], [place] - [address]  Pharmacy Notified: YES  Patient Notified: YES pharmacy will notify when ready

## 2024-07-02 ENCOUNTER — PATIENT OUTREACH (OUTPATIENT)
Dept: CARE COORDINATION | Facility: CLINIC | Age: 54
End: 2024-07-02
Payer: COMMERCIAL

## 2024-08-13 ENCOUNTER — TELEPHONE (OUTPATIENT)
Dept: FAMILY MEDICINE | Facility: CLINIC | Age: 54
End: 2024-08-13
Payer: COMMERCIAL

## 2024-08-13 DIAGNOSIS — R92.1 BREAST CALCIFICATION SEEN ON MAMMOGRAM: Primary | ICD-10-CM

## 2024-08-13 NOTE — TELEPHONE ENCOUNTER
Dr. Samuels:    Received a call from Brenda in imaging ([phone]), Brenda is asking you to please place orders for a diagnostic bilateral mammogram for follow up breast calcification, order is cued up for your review.      FAREED Diaz

## 2024-09-12 DIAGNOSIS — I73.00 RAYNAUD'S DISEASE WITHOUT GANGRENE: ICD-10-CM

## 2024-09-12 RX ORDER — AMLODIPINE BESYLATE 5 MG/1
5 TABLET ORAL 2 TIMES DAILY
Qty: 180 TABLET | Refills: 1 | Status: SHIPPED | OUTPATIENT
Start: 2024-09-12

## 2024-12-05 DIAGNOSIS — R12 HEARTBURN: ICD-10-CM

## 2024-12-05 DIAGNOSIS — I73.00 RAYNAUD'S DISEASE WITHOUT GANGRENE: ICD-10-CM

## 2024-12-23 DIAGNOSIS — I73.00 RAYNAUD'S DISEASE WITHOUT GANGRENE: ICD-10-CM

## 2024-12-24 RX ORDER — SILDENAFIL CITRATE 20 MG/1
20 TABLET ORAL 3 TIMES DAILY
Qty: 90 TABLET | Refills: 0 | Status: SHIPPED | OUTPATIENT
Start: 2024-12-24

## 2025-01-16 DIAGNOSIS — I73.00 RAYNAUD'S DISEASE WITHOUT GANGRENE: ICD-10-CM

## 2025-01-16 RX ORDER — SILDENAFIL CITRATE 20 MG/1
20 TABLET ORAL 3 TIMES DAILY
Qty: 90 TABLET | Refills: 0 | Status: SHIPPED | OUTPATIENT
Start: 2025-01-16

## 2025-01-17 ENCOUNTER — TELEPHONE (OUTPATIENT)
Dept: RHEUMATOLOGY | Facility: CLINIC | Age: 55
End: 2025-01-17
Payer: COMMERCIAL

## 2025-01-17 NOTE — TELEPHONE ENCOUNTER
Prior Authorization Retail Medication Request    Medication/Dose: Sildenafil 20 mg  Diagnosis and ICD code (if different than what is on RX):    New/renewal/insurance change PA/secondary ins. PA:  Previously Tried and Failed:    Rationale:      Insurance   [facility]  Insurance ID:  [id number]    Secondary (if applicable):  Insurance ID:      Pharmacy Information (if different than what is on RX)  Name:    Phone:    Fax:    Clinic Information  Preferred routing pool for dept communication:

## 2025-01-20 ENCOUNTER — VIRTUAL VISIT (OUTPATIENT)
Dept: RHEUMATOLOGY | Facility: CLINIC | Age: 55
End: 2025-01-20
Payer: COMMERCIAL

## 2025-01-20 DIAGNOSIS — L98.491 ISCHEMIC ULCER OF FINGER, LIMITED TO BREAKDOWN OF SKIN (H): ICD-10-CM

## 2025-01-20 DIAGNOSIS — R12 HEARTBURN: ICD-10-CM

## 2025-01-20 DIAGNOSIS — I73.00 RAYNAUD'S DISEASE WITHOUT GANGRENE: Primary | ICD-10-CM

## 2025-01-20 PROCEDURE — 98006 SYNCH AUDIO-VIDEO EST MOD 30: CPT | Performed by: INTERNAL MEDICINE

## 2025-01-20 PROCEDURE — G2211 COMPLEX E/M VISIT ADD ON: HCPCS | Performed by: INTERNAL MEDICINE

## 2025-01-20 RX ORDER — AMLODIPINE BESYLATE 5 MG/1
5 TABLET ORAL 2 TIMES DAILY
Qty: 180 TABLET | Refills: 1 | Status: SHIPPED | OUTPATIENT
Start: 2025-01-20

## 2025-01-20 NOTE — PROGRESS NOTES
Virtual Visit Details    Type of service:  Video Visit     Originating Location (pt. Location): Home    Distant Location (provider location):  On-site  Platform used for Video Visit: Akira       This document was created using a software with less than 100% fidelity, at times resulting in unintended, even erroneous syntax and grammar.  The reader is advised to keep this under consideration while reviewing, interpreting this note.           ASSESSMENT AND PLAN:    Diagnoses and all orders for this visit:  Raynaud's disease without gangrene  -     amLODIPine (NORVASC) 5 MG tablet; Take 1 tablet (5 mg) by mouth 2 times daily.  Heartburn  Ischemic ulcer of finger, limited to breakdown of skin (H)  The longitudinal plan of care for the diagnosis(es)/condition(s) as documented were addressed during this visit. Due to the added complexity in care, I will continue to support Brenda in the subsequent management and with ongoing continuity of care.      Follow up in 1 year      HISTORY OF PRESENTING ILLNESS:  Brenda ZAMBRANO Hallet 54 year old is evaluated here via video/audio link.  This is for Raynaud's, reflux symptoms, in the absence of serologic signals of autoimmunity including negative SIMONE, anticentromere, negative SCL 70.  The past few weeks she has broken out and an ulceration on her fingers, left side index and middle.  These are painful.  She has not had shortness of breath there is no skin tightening or shiny skin on the fingers.,   Raynaud's which can be quite severe.  At times leading to ulceration of fingertips with open sores, also heartburn reflux symptoms. She is on sildenafil, amlodipine.  She does get heartburn for which she takes over-the-counter measures.  She has not noticed thickening of the skin.  There are occasional mouth ulcers.  No joint pains.  The symptoms have been of minimal severity during summer months     ROS enquiry held for fever, ocular symptoms, rash, headache,  GI issues.  ALLERGIES:Contrast  dye and Norgestrel-ethinyl estradiol    PAST MEDICAL/ACTIVE PROBLEMS/MEDICATION/SOCIAL DATA  No past medical history on file.  History   Smoking Status    Never   Smokeless Tobacco    Never     Patient Active Problem List   Diagnosis    Raynaud's disease without gangrene    Ischemic ulcer of finger, limited to breakdown of skin (H)    Bilateral lower extremity edema    Heartburn    Thyroid nodule    Muscle pain    Low back pain    Cervical dysplasia    Bulimia    Breast mass, left     Current Outpatient Medications   Medication Sig Dispense Refill    amLODIPine (NORVASC) 5 MG tablet TAKE ONE TABLET BY MOUTH TWICE A  tablet 1    ibuprofen (ADVIL/MOTRIN) 200 MG tablet       latanoprost (XALATAN) 0.005 % ophthalmic solution       multivitamin w/minerals (THERA-VIT-M) tablet Take 1 tablet by mouth daily      omeprazole (PRILOSEC) 20 MG DR capsule TAKE ONE CAPSULE BY MOUTH ONCE DAILY 90 capsule 0    sildenafil (REVATIO) 20 MG tablet TAKE ONE TABLET BY MOUTH THREE TIMES A DAY 90 tablet 0    UNABLE TO FIND 2,250 mg daily MEDICATION NAME: tumeric      UNABLE TO FIND Take 500 mg by mouth daily MEDICATION NAME: curcumoids      VITAMIN D PO Take 1 tablet by mouth daily           EXAMINATION:    Using the audio and video link as best as possible the constitutional, neck, neurologic, psych, skin, both upper extremities areas/organ system were evaluated during this assessment.  Some of the important findings: Alert, oriented, speech fluent.   Able to fully flex the digits, into fists bilaterally, wrist and elbow range of motion appear normal, abduction of the shoulder is normal.  Healing ulcer at the tip of the right middle and index finger.  There is no features suggestive of gangrenous morphology.  As she followed the instructions and video she was able to with quite good certainly show absence of sclerodactyly on her digits.    LAB / IMAGING DATA:  ALT   Date Value Ref Range Status   01/04/2023 44 (H) 10 - 35 U/L Final  "  08/04/2021 25 0 - 45 U/L Final   11/16/2020 28 0 - 45 U/L Final     Albumin   Date Value Ref Range Status   01/04/2023 4.0 3.5 - 5.2 g/dL Final   08/04/2021 4.0 3.5 - 5.0 g/dL Final   11/16/2020 4.2 3.5 - 5.0 g/dL Final   10/16/2019 4.0 3.5 - 5.0 g/dL Final       WBC Count   Date Value Ref Range Status   01/04/2023 6.2 4.0 - 11.0 10e3/uL Final   08/04/2021 5.6 4.0 - 11.0 10e3/uL Final     Hemoglobin   Date Value Ref Range Status   01/04/2023 12.5 11.7 - 15.7 g/dL Final   08/04/2021 12.9 11.7 - 15.7 g/dL Final   11/16/2020 13.7 12.0 - 16.0 g/dL Final     Platelet Count   Date Value Ref Range Status   01/04/2023 228 150 - 450 10e3/uL Final   08/04/2021 245 150 - 450 10e3/uL Final   11/16/2020 241 140 - 440 thou/uL Final       No results found for: \"SIMONE\"   "

## 2025-01-21 NOTE — TELEPHONE ENCOUNTER
Prior Authorization Approval    Medication: SILDENAFIL CITRATE 20 MG PO TABS  Authorization Effective Date: 12/22/2024  Authorization Expiration Date: 1/20/2030  Approved Dose/Quantity:   Reference #:     Insurance Company: [employer] - Phone [phone] Fax [phone]  Expected CoPay: $    CoPay Card Available:      Financial Assistance Needed:   Which Pharmacy is filling the prescription: [facility] [place] [place], [place] - [address] [facility] [address]  Pharmacy Notified: YES  Patient Notified: **Instructed pharmacy to notify patient when script is ready to /ship.**

## 2025-01-21 NOTE — TELEPHONE ENCOUNTER
PA Initiation    Medication: SILDENAFIL CITRATE 20 MG PO TABS  Insurance Company: [employer] - Phone [phone] Fax [phone]  Pharmacy Filling the Rx: [facility] - [id number]  Filling Pharmacy Phone: [phone]  Filling Pharmacy Fax: [phone]  Start Date: 1/20/2025

## 2025-02-15 DIAGNOSIS — I73.00 RAYNAUD'S DISEASE WITHOUT GANGRENE: ICD-10-CM

## 2025-02-17 RX ORDER — SILDENAFIL CITRATE 20 MG/1
20 TABLET ORAL 3 TIMES DAILY
Qty: 90 TABLET | Refills: 3 | Status: SHIPPED | OUTPATIENT
Start: 2025-02-17

## 2025-03-02 DIAGNOSIS — I73.00 RAYNAUD'S DISEASE WITHOUT GANGRENE: ICD-10-CM

## 2025-03-02 DIAGNOSIS — R12 HEARTBURN: ICD-10-CM

## 2025-03-03 RX ORDER — OMEPRAZOLE 20 MG/1
20 CAPSULE, DELAYED RELEASE ORAL DAILY
Qty: 90 CAPSULE | Refills: 1 | Status: SHIPPED | OUTPATIENT
Start: 2025-03-03

## 2025-04-13 ENCOUNTER — HEALTH MAINTENANCE LETTER (OUTPATIENT)
Age: 55
End: 2025-04-13

## 2025-06-11 DIAGNOSIS — I73.00 RAYNAUD'S DISEASE WITHOUT GANGRENE: ICD-10-CM

## 2025-06-11 RX ORDER — SILDENAFIL CITRATE 20 MG/1
20 TABLET ORAL 3 TIMES DAILY
Qty: 90 TABLET | Refills: 2 | Status: SHIPPED | OUTPATIENT
Start: 2025-06-11

## 2025-06-12 DIAGNOSIS — I73.00 RAYNAUD'S DISEASE WITHOUT GANGRENE: ICD-10-CM

## 2025-06-12 RX ORDER — SILDENAFIL CITRATE 20 MG/1
20 TABLET ORAL 3 TIMES DAILY
Qty: 90 TABLET | Refills: 3 | OUTPATIENT
Start: 2025-06-12

## 2025-06-14 DIAGNOSIS — I73.00 RAYNAUD'S DISEASE WITHOUT GANGRENE: ICD-10-CM

## 2025-06-16 RX ORDER — SILDENAFIL CITRATE 20 MG/1
20 TABLET ORAL 3 TIMES DAILY
Qty: 90 TABLET | Refills: 3 | OUTPATIENT
Start: 2025-06-16

## 2025-07-09 DIAGNOSIS — I73.00 RAYNAUD'S DISEASE WITHOUT GANGRENE: ICD-10-CM

## 2025-07-09 RX ORDER — AMLODIPINE BESYLATE 5 MG/1
5 TABLET ORAL 2 TIMES DAILY
Qty: 180 TABLET | Refills: 0 | Status: SHIPPED | OUTPATIENT
Start: 2025-07-09

## 2025-08-28 ENCOUNTER — TELEPHONE (OUTPATIENT)
Dept: RHEUMATOLOGY | Facility: CLINIC | Age: 55
End: 2025-08-28
Payer: COMMERCIAL

## 2025-08-28 DIAGNOSIS — R12 HEARTBURN: ICD-10-CM

## 2025-08-28 DIAGNOSIS — I73.00 RAYNAUD'S DISEASE WITHOUT GANGRENE: ICD-10-CM

## 2025-08-28 RX ORDER — OMEPRAZOLE 20 MG/1
20 CAPSULE, DELAYED RELEASE ORAL DAILY
Qty: 90 CAPSULE | Refills: 0 | Status: SHIPPED | OUTPATIENT
Start: 2025-08-28